# Patient Record
Sex: FEMALE | Race: WHITE | Employment: UNEMPLOYED | URBAN - METROPOLITAN AREA
[De-identification: names, ages, dates, MRNs, and addresses within clinical notes are randomized per-mention and may not be internally consistent; named-entity substitution may affect disease eponyms.]

---

## 2017-07-20 PROBLEM — R42 DIZZINESS: Status: ACTIVE | Noted: 2017-07-20

## 2018-02-01 PROBLEM — R53.1 WEAKNESS: Status: ACTIVE | Noted: 2018-02-01

## 2018-11-05 ENCOUNTER — APPOINTMENT (OUTPATIENT)
Dept: GENERAL RADIOLOGY | Age: 83
End: 2018-11-05
Payer: MEDICARE

## 2018-11-05 ENCOUNTER — APPOINTMENT (OUTPATIENT)
Dept: CT IMAGING | Age: 83
End: 2018-11-05
Payer: MEDICARE

## 2018-11-05 ENCOUNTER — HOSPITAL ENCOUNTER (EMERGENCY)
Age: 83
Discharge: HOME | End: 2018-11-06
Attending: EMERGENCY MEDICINE
Payer: MEDICARE

## 2018-11-05 DIAGNOSIS — R42 DIZZINESS: Primary | ICD-10-CM

## 2018-11-05 DIAGNOSIS — R73.9 HYPERGLYCEMIA: ICD-10-CM

## 2018-11-05 LAB
ALBUMIN SERPL-MCNC: 4.1 G/DL (ref 3.5–5.2)
ALP BLD-CCNC: 86 U/L (ref 35–104)
ALT SERPL-CCNC: 26 U/L (ref 0–32)
ANION GAP SERPL CALCULATED.3IONS-SCNC: 15 MMOL/L (ref 7–16)
AST SERPL-CCNC: 19 U/L (ref 0–31)
BASOPHILS ABSOLUTE: 0.02 E9/L (ref 0–0.2)
BASOPHILS RELATIVE PERCENT: 0.4 % (ref 0–2)
BILIRUB SERPL-MCNC: 0.3 MG/DL (ref 0–1.2)
BILIRUBIN URINE: NEGATIVE
BLOOD, URINE: NEGATIVE
BUN BLDV-MCNC: 16 MG/DL (ref 8–23)
CALCIUM SERPL-MCNC: 9.7 MG/DL (ref 8.6–10.2)
CHLORIDE BLD-SCNC: 95 MMOL/L (ref 98–107)
CLARITY: CLEAR
CO2: 27 MMOL/L (ref 22–29)
COLOR: YELLOW
CREAT SERPL-MCNC: 0.6 MG/DL (ref 0.5–1)
EOSINOPHILS ABSOLUTE: 0.07 E9/L (ref 0.05–0.5)
EOSINOPHILS RELATIVE PERCENT: 1.5 % (ref 0–6)
GFR AFRICAN AMERICAN: >60
GFR NON-AFRICAN AMERICAN: >60 ML/MIN/1.73
GLUCOSE BLD-MCNC: 553 MG/DL (ref 74–99)
GLUCOSE URINE: >=1000 MG/DL
HCT VFR BLD CALC: 42.8 % (ref 34–48)
HEMOGLOBIN: 14.2 G/DL (ref 11.5–15.5)
IMMATURE GRANULOCYTES #: 0.01 E9/L
IMMATURE GRANULOCYTES %: 0.2 % (ref 0–5)
KETONES, URINE: NEGATIVE MG/DL
LEUKOCYTE ESTERASE, URINE: NEGATIVE
LYMPHOCYTES ABSOLUTE: 1.61 E9/L (ref 1.5–4)
LYMPHOCYTES RELATIVE PERCENT: 34.5 % (ref 20–42)
MCH RBC QN AUTO: 28.2 PG (ref 26–35)
MCHC RBC AUTO-ENTMCNC: 33.2 % (ref 32–34.5)
MCV RBC AUTO: 85.1 FL (ref 80–99.9)
METER GLUCOSE: >500 MG/DL (ref 74–99)
MONOCYTES ABSOLUTE: 0.34 E9/L (ref 0.1–0.95)
MONOCYTES RELATIVE PERCENT: 7.3 % (ref 2–12)
NEUTROPHILS ABSOLUTE: 2.61 E9/L (ref 1.8–7.3)
NEUTROPHILS RELATIVE PERCENT: 56.1 % (ref 43–80)
NITRITE, URINE: NEGATIVE
PDW BLD-RTO: 12.6 FL (ref 11.5–15)
PH UA: 7 (ref 5–9)
PLATELET # BLD: 146 E9/L (ref 130–450)
PMV BLD AUTO: 11.6 FL (ref 7–12)
POTASSIUM REFLEX MAGNESIUM: 4.2 MMOL/L (ref 3.5–5)
PROTEIN UA: NEGATIVE MG/DL
RBC # BLD: 5.03 E12/L (ref 3.5–5.5)
SODIUM BLD-SCNC: 137 MMOL/L (ref 132–146)
SPECIFIC GRAVITY UA: 1.01 (ref 1–1.03)
TOTAL PROTEIN: 6.8 G/DL (ref 6.4–8.3)
TROPONIN: <0.01 NG/ML (ref 0–0.03)
UROBILINOGEN, URINE: 0.2 E.U./DL
WBC # BLD: 4.7 E9/L (ref 4.5–11.5)

## 2018-11-05 PROCEDURE — 70450 CT HEAD/BRAIN W/O DYE: CPT

## 2018-11-05 PROCEDURE — 99285 EMERGENCY DEPT VISIT HI MDM: CPT

## 2018-11-05 PROCEDURE — 96375 TX/PRO/DX INJ NEW DRUG ADDON: CPT

## 2018-11-05 PROCEDURE — 6370000000 HC RX 637 (ALT 250 FOR IP): Performed by: STUDENT IN AN ORGANIZED HEALTH CARE EDUCATION/TRAINING PROGRAM

## 2018-11-05 PROCEDURE — 81003 URINALYSIS AUTO W/O SCOPE: CPT

## 2018-11-05 PROCEDURE — 2580000003 HC RX 258: Performed by: STUDENT IN AN ORGANIZED HEALTH CARE EDUCATION/TRAINING PROGRAM

## 2018-11-05 PROCEDURE — 36415 COLL VENOUS BLD VENIPUNCTURE: CPT

## 2018-11-05 PROCEDURE — 6360000002 HC RX W HCPCS: Performed by: STUDENT IN AN ORGANIZED HEALTH CARE EDUCATION/TRAINING PROGRAM

## 2018-11-05 PROCEDURE — 73562 X-RAY EXAM OF KNEE 3: CPT

## 2018-11-05 PROCEDURE — 93005 ELECTROCARDIOGRAM TRACING: CPT | Performed by: STUDENT IN AN ORGANIZED HEALTH CARE EDUCATION/TRAINING PROGRAM

## 2018-11-05 PROCEDURE — 80053 COMPREHEN METABOLIC PANEL: CPT

## 2018-11-05 PROCEDURE — 71045 X-RAY EXAM CHEST 1 VIEW: CPT

## 2018-11-05 PROCEDURE — 84484 ASSAY OF TROPONIN QUANT: CPT

## 2018-11-05 PROCEDURE — 85025 COMPLETE CBC W/AUTO DIFF WBC: CPT

## 2018-11-05 PROCEDURE — 96374 THER/PROPH/DIAG INJ IV PUSH: CPT

## 2018-11-05 PROCEDURE — 82962 GLUCOSE BLOOD TEST: CPT

## 2018-11-05 RX ORDER — SODIUM CHLORIDE 9 MG/ML
500 INJECTION, SOLUTION INTRAVENOUS CONTINUOUS
Status: DISCONTINUED | OUTPATIENT
Start: 2018-11-05 | End: 2018-11-06 | Stop reason: HOSPADM

## 2018-11-05 RX ORDER — MECLIZINE HCL 12.5 MG/1
25 TABLET ORAL ONCE
Status: COMPLETED | OUTPATIENT
Start: 2018-11-05 | End: 2018-11-05

## 2018-11-05 RX ORDER — ONDANSETRON 2 MG/ML
4 INJECTION INTRAMUSCULAR; INTRAVENOUS ONCE
Status: COMPLETED | OUTPATIENT
Start: 2018-11-05 | End: 2018-11-05

## 2018-11-05 RX ORDER — 0.9 % SODIUM CHLORIDE 0.9 %
1000 INTRAVENOUS SOLUTION INTRAVENOUS ONCE
Status: COMPLETED | OUTPATIENT
Start: 2018-11-05 | End: 2018-11-06

## 2018-11-05 RX ADMIN — SODIUM CHLORIDE 500 ML: 9 INJECTION, SOLUTION INTRAVENOUS at 21:10

## 2018-11-05 RX ADMIN — SODIUM CHLORIDE 1000 ML: 9 INJECTION, SOLUTION INTRAVENOUS at 23:00

## 2018-11-05 RX ADMIN — MECLIZINE 25 MG: 12.5 TABLET ORAL at 23:02

## 2018-11-05 RX ADMIN — ONDANSETRON 4 MG: 2 INJECTION INTRAMUSCULAR; INTRAVENOUS at 21:12

## 2018-11-05 RX ADMIN — INSULIN HUMAN 6 UNITS: 100 INJECTION, SOLUTION PARENTERAL at 23:03

## 2018-11-05 ASSESSMENT — ENCOUNTER SYMPTOMS
RHINORRHEA: 0
SHORTNESS OF BREATH: 0
NAUSEA: 1
COUGH: 0
DIARRHEA: 0
VOMITING: 0
ABDOMINAL PAIN: 0
BACK PAIN: 0
SORE THROAT: 0

## 2018-11-06 VITALS
OXYGEN SATURATION: 97 % | BODY MASS INDEX: 27.73 KG/M2 | HEART RATE: 79 BPM | RESPIRATION RATE: 21 BRPM | DIASTOLIC BLOOD PRESSURE: 80 MMHG | SYSTOLIC BLOOD PRESSURE: 161 MMHG | WEIGHT: 142 LBS | TEMPERATURE: 98 F

## 2018-11-06 LAB
CHP ED QC CHECK: NORMAL
GLUCOSE BLD-MCNC: 373 MG/DL
METER GLUCOSE: 348 MG/DL (ref 74–99)
METER GLUCOSE: 373 MG/DL (ref 74–99)

## 2018-11-06 PROCEDURE — 96376 TX/PRO/DX INJ SAME DRUG ADON: CPT

## 2018-11-06 PROCEDURE — 6370000000 HC RX 637 (ALT 250 FOR IP): Performed by: STUDENT IN AN ORGANIZED HEALTH CARE EDUCATION/TRAINING PROGRAM

## 2018-11-06 PROCEDURE — 2580000003 HC RX 258: Performed by: STUDENT IN AN ORGANIZED HEALTH CARE EDUCATION/TRAINING PROGRAM

## 2018-11-06 PROCEDURE — 82962 GLUCOSE BLOOD TEST: CPT

## 2018-11-06 RX ORDER — 0.9 % SODIUM CHLORIDE 0.9 %
500 INTRAVENOUS SOLUTION INTRAVENOUS ONCE
Status: COMPLETED | OUTPATIENT
Start: 2018-11-06 | End: 2018-11-06

## 2018-11-06 RX ADMIN — INSULIN HUMAN 4 UNITS: 100 INJECTION, SOLUTION PARENTERAL at 00:47

## 2018-11-06 RX ADMIN — SODIUM CHLORIDE 500 ML: 9 INJECTION, SOLUTION INTRAVENOUS at 00:50

## 2018-11-06 NOTE — ED PROVIDER NOTES
Patient is a 80year old female who is presenting with complaint of dizziness. She states that an hour and a half ago she got up to go to the bathroom when she started to notice everything spinning. She started to fall and caught herself on the door handle. She then called ems and presented. She states this has happened to her in the past when her sugar went up. Per EMS she is an insulin dependent diabetic and is non-compliant. She is not able to give herself shots therefore does not take them. Her son comes by from time to time to give her shots, but is unable to do this for her everyday. She does have history of a stroke in the past which has left her left LE weak with decreased sensation. The history is provided by the patient. Dizziness   Quality:  Head spinning and room spinning  Severity:  Moderate  Onset quality:  Sudden  Duration:  4 hours  Timing:  Constant  Chronicity:  New  Context: eye movement and head movement    Relieved by:  None tried  Associated symptoms: nausea    Associated symptoms: no chest pain, no diarrhea, no headaches, no shortness of breath and no vomiting        Review of Systems   Constitutional: Negative for chills, diaphoresis and fever. HENT: Negative for congestion, rhinorrhea and sore throat. Eyes: Negative for visual disturbance. Respiratory: Negative for cough and shortness of breath. Cardiovascular: Negative for chest pain. Gastrointestinal: Positive for nausea. Negative for abdominal pain, diarrhea and vomiting. Genitourinary: Negative for dysuria and urgency. Musculoskeletal: Negative for back pain. Skin: Negative for rash. Neurological: Positive for dizziness. Negative for light-headedness, numbness and headaches. Physical Exam   Constitutional: She is oriented to person, place, and time. She appears well-developed and well-nourished. No distress. HENT:   Head: Normocephalic and atraumatic.    Eyes: Pupils are equal, round, and reactive to

## 2018-11-06 NOTE — ED NOTES
Patient taken off bedpan, extra linens removed, vitals taken, Dr. Darcel Kehr updated      Curtiss Closs, HERBIE  11/05/18 2945

## 2018-11-08 LAB
EKG ATRIAL RATE: 81 BPM
EKG P AXIS: 57 DEGREES
EKG P-R INTERVAL: 176 MS
EKG Q-T INTERVAL: 414 MS
EKG QRS DURATION: 80 MS
EKG QTC CALCULATION (BAZETT): 480 MS
EKG R AXIS: 10 DEGREES
EKG T AXIS: 82 DEGREES
EKG VENTRICULAR RATE: 81 BPM

## 2019-04-15 ENCOUNTER — APPOINTMENT (OUTPATIENT)
Dept: GENERAL RADIOLOGY | Age: 84
End: 2019-04-15
Payer: MEDICARE

## 2019-04-15 ENCOUNTER — HOSPITAL ENCOUNTER (EMERGENCY)
Age: 84
Discharge: HOME | End: 2019-04-15
Attending: EMERGENCY MEDICINE
Payer: MEDICARE

## 2019-04-15 VITALS
HEIGHT: 59 IN | SYSTOLIC BLOOD PRESSURE: 154 MMHG | DIASTOLIC BLOOD PRESSURE: 78 MMHG | OXYGEN SATURATION: 99 % | WEIGHT: 132 LBS | BODY MASS INDEX: 26.61 KG/M2 | HEART RATE: 74 BPM | RESPIRATION RATE: 17 BRPM | TEMPERATURE: 97.4 F

## 2019-04-15 DIAGNOSIS — M79.604 PAIN IN BOTH LOWER EXTREMITIES: Primary | ICD-10-CM

## 2019-04-15 DIAGNOSIS — G62.9 PERIPHERAL POLYNEUROPATHY: ICD-10-CM

## 2019-04-15 DIAGNOSIS — M79.605 PAIN IN BOTH LOWER EXTREMITIES: Primary | ICD-10-CM

## 2019-04-15 LAB
ALBUMIN SERPL-MCNC: 3.6 G/DL (ref 3.5–5.2)
ALP BLD-CCNC: 115 U/L (ref 35–104)
ALT SERPL-CCNC: 51 U/L (ref 0–32)
ANION GAP SERPL CALCULATED.3IONS-SCNC: 6 MMOL/L (ref 7–16)
AST SERPL-CCNC: 40 U/L (ref 0–31)
BASOPHILS ABSOLUTE: 0.03 E9/L (ref 0–0.2)
BASOPHILS RELATIVE PERCENT: 0.5 % (ref 0–2)
BETA-HYDROXYBUTYRATE: 0.5 MMOL/L (ref 0.02–0.27)
BILIRUB SERPL-MCNC: 0.4 MG/DL (ref 0–1.2)
BUN BLDV-MCNC: 17 MG/DL (ref 8–23)
CALCIUM SERPL-MCNC: 8.8 MG/DL (ref 8.6–10.2)
CHLORIDE BLD-SCNC: 98 MMOL/L (ref 98–107)
CO2: 27 MMOL/L (ref 22–29)
CREAT SERPL-MCNC: 0.6 MG/DL (ref 0.5–1)
EOSINOPHILS ABSOLUTE: 0.1 E9/L (ref 0.05–0.5)
EOSINOPHILS RELATIVE PERCENT: 1.5 % (ref 0–6)
GFR AFRICAN AMERICAN: >60
GFR AFRICAN AMERICAN: >60
GFR NON-AFRICAN AMERICAN: >60 ML/MIN/1.73
GFR NON-AFRICAN AMERICAN: >60 ML/MIN/1.73
GLUCOSE BLD-MCNC: 283 MG/DL (ref 74–99)
GLUCOSE BLD-MCNC: 293 MG/DL (ref 74–99)
HCT VFR BLD CALC: 40.6 % (ref 34–48)
HEMOGLOBIN: 13.7 G/DL (ref 11.5–15.5)
IMMATURE GRANULOCYTES #: 0.01 E9/L
IMMATURE GRANULOCYTES %: 0.2 % (ref 0–5)
LACTIC ACID: 1.2 MMOL/L (ref 0.5–2.2)
LYMPHOCYTES ABSOLUTE: 1.24 E9/L (ref 1.5–4)
LYMPHOCYTES RELATIVE PERCENT: 19 % (ref 20–42)
MCH RBC QN AUTO: 29.2 PG (ref 26–35)
MCHC RBC AUTO-ENTMCNC: 33.7 % (ref 32–34.5)
MCV RBC AUTO: 86.6 FL (ref 80–99.9)
METER GLUCOSE: 219 MG/DL (ref 74–99)
MONOCYTES ABSOLUTE: 0.58 E9/L (ref 0.1–0.95)
MONOCYTES RELATIVE PERCENT: 8.9 % (ref 2–12)
NEUTROPHILS ABSOLUTE: 4.55 E9/L (ref 1.8–7.3)
NEUTROPHILS RELATIVE PERCENT: 69.9 % (ref 43–80)
PDW BLD-RTO: 12.9 FL (ref 11.5–15)
PERFORMED ON: ABNORMAL
PH VENOUS: 7.34 (ref 7.3–7.42)
PLATELET # BLD: 178 E9/L (ref 130–450)
PMV BLD AUTO: 11 FL (ref 7–12)
POC CHLORIDE: 104 MMOL/L (ref 100–108)
POC CREATININE: 0.7 MG/DL (ref 0.5–1)
POC POTASSIUM: 6.4 MMOL/L (ref 3.5–5)
POC SODIUM: 134 MMOL/L (ref 132–146)
POTASSIUM REFLEX MAGNESIUM: 4.2 MMOL/L (ref 3.5–5)
POTASSIUM SERPL-SCNC: 4.8 MMOL/L (ref 3.5–5)
RBC # BLD: 4.69 E12/L (ref 3.5–5.5)
SODIUM BLD-SCNC: 131 MMOL/L (ref 132–146)
TOTAL CK: 40 U/L (ref 20–180)
TOTAL PROTEIN: 6.5 G/DL (ref 6.4–8.3)
WBC # BLD: 6.5 E9/L (ref 4.5–11.5)

## 2019-04-15 PROCEDURE — 80053 COMPREHEN METABOLIC PANEL: CPT

## 2019-04-15 PROCEDURE — 84295 ASSAY OF SERUM SODIUM: CPT

## 2019-04-15 PROCEDURE — 36415 COLL VENOUS BLD VENIPUNCTURE: CPT

## 2019-04-15 PROCEDURE — 73590 X-RAY EXAM OF LOWER LEG: CPT

## 2019-04-15 PROCEDURE — 82565 ASSAY OF CREATININE: CPT

## 2019-04-15 PROCEDURE — 85025 COMPLETE CBC W/AUTO DIFF WBC: CPT

## 2019-04-15 PROCEDURE — 84132 ASSAY OF SERUM POTASSIUM: CPT

## 2019-04-15 PROCEDURE — 82435 ASSAY OF BLOOD CHLORIDE: CPT

## 2019-04-15 PROCEDURE — 6360000002 HC RX W HCPCS: Performed by: EMERGENCY MEDICINE

## 2019-04-15 PROCEDURE — 82550 ASSAY OF CK (CPK): CPT

## 2019-04-15 PROCEDURE — 82947 ASSAY GLUCOSE BLOOD QUANT: CPT

## 2019-04-15 PROCEDURE — 82962 GLUCOSE BLOOD TEST: CPT

## 2019-04-15 PROCEDURE — 99284 EMERGENCY DEPT VISIT MOD MDM: CPT

## 2019-04-15 PROCEDURE — 82010 KETONE BODYS QUAN: CPT

## 2019-04-15 PROCEDURE — 82800 BLOOD PH: CPT

## 2019-04-15 PROCEDURE — 96375 TX/PRO/DX INJ NEW DRUG ADDON: CPT

## 2019-04-15 PROCEDURE — 2580000003 HC RX 258: Performed by: EMERGENCY MEDICINE

## 2019-04-15 PROCEDURE — 96374 THER/PROPH/DIAG INJ IV PUSH: CPT

## 2019-04-15 PROCEDURE — 83605 ASSAY OF LACTIC ACID: CPT

## 2019-04-15 PROCEDURE — 97165 OT EVAL LOW COMPLEX 30 MIN: CPT

## 2019-04-15 PROCEDURE — 97161 PT EVAL LOW COMPLEX 20 MIN: CPT

## 2019-04-15 RX ORDER — KETOROLAC TROMETHAMINE 30 MG/ML
15 INJECTION, SOLUTION INTRAMUSCULAR; INTRAVENOUS ONCE
Status: COMPLETED | OUTPATIENT
Start: 2019-04-15 | End: 2019-04-15

## 2019-04-15 RX ORDER — 0.9 % SODIUM CHLORIDE 0.9 %
1000 INTRAVENOUS SOLUTION INTRAVENOUS ONCE
Status: COMPLETED | OUTPATIENT
Start: 2019-04-15 | End: 2019-04-15

## 2019-04-15 RX ORDER — FENTANYL CITRATE 50 UG/ML
25 INJECTION, SOLUTION INTRAMUSCULAR; INTRAVENOUS ONCE
Status: COMPLETED | OUTPATIENT
Start: 2019-04-15 | End: 2019-04-15

## 2019-04-15 RX ADMIN — FENTANYL CITRATE 25 MCG: 50 INJECTION, SOLUTION INTRAMUSCULAR; INTRAVENOUS at 10:16

## 2019-04-15 RX ADMIN — SODIUM CHLORIDE 1000 ML: 9 INJECTION, SOLUTION INTRAVENOUS at 07:58

## 2019-04-15 RX ADMIN — KETOROLAC TROMETHAMINE 15 MG: 30 INJECTION, SOLUTION INTRAMUSCULAR at 12:02

## 2019-04-15 ASSESSMENT — PAIN SCALES - GENERAL
PAINLEVEL_OUTOF10: 10
PAINLEVEL_OUTOF10: 10
PAINLEVEL_OUTOF10: 9

## 2019-04-15 ASSESSMENT — PAIN DESCRIPTION - ORIENTATION: ORIENTATION: LEFT

## 2019-04-15 ASSESSMENT — PAIN DESCRIPTION - PAIN TYPE: TYPE: ACUTE PAIN

## 2019-04-15 NOTE — PROGRESS NOTES
Occupational Therapy  OCCUPATIONAL THERAPY INITIAL EVALUATION      Date:4/15/2019  Patient Name: Liyah Fenton  MRN: [id number]  : 1926  Room: [address] [place] [address]    Evaluating OT: Srinivas Callahan OTR/L #[id number]     AM-PAC Daily Activity Raw Score:     Recommended Adaptive Equipment:  TBD     Diagnosis: none   Patient presented to ED with B LE wounds and difficulty ambulating      Pertinent Medical History:    Past Medical History:   Diagnosis Date    Diabetes mellitus ([facility] [address]     Hypertension     Unspecified cerebral artery occlusion with cerebral infarction     affected right side      Precautions:  Falls     Home Living: Pt lives alone in a 1 story apartment with 3 JUAN PABLO and 1 hand rail   Bathroom setup: walk-in shower with seat   Equipment owned: w.w    Prior Level of Function: mod I with ADLs (assist with showers) , mod I with IADLs; ambulated with w/w  Driving: no  Occupation: na    Pain Level: Pt reports 9-10/10 L LE pain this sesison; Pt reports receiving pain medication prior to session    Cognition: A&O: 2/4 (self and place);  Follows 1 step directions   Memory:  Fair-   Sequencing:  fair   Problem solving:  Fair-   Judgement/safety:  Fair-     Functional Assessment:   Initial Eval Status  Date: 4/15/19 Treatment Status  Date: Short Term Goals  Treatment frequency: PRN   Feeding Independent       Grooming Stand by Assist   (seated)  Modified [place]    UB Dressing Minimal Assist   Modified [place]    LB Dressing Dependent   Moderate Assist    Bathing Maximal Assist  Moderate Assist    Toileting Dependent   Incontinent of urine  Moderate Assist    Bed Mobility  Supine to sit: Minimal Assist   Sit to supine: Maximal Assist   Supine to sit: Stand by Assist   Sit to supine: Stand by Assist    Functional Transfers Moderate Assist   Minimal Assist    Functional Mobility Moderate Assist   Several side steps to [facility] with w/w (assist with weightshfiting, w/w management, posture and sequencing)  Minimal Assist Balance Sitting:     Static:  SBA    Dynamic: SBA  Standing: min A (static); mod A dynamic      Activity Tolerance P+  F   Visual/  Perceptual Glasses: yes  wfl                  Hand dominance: right     Strength ROM Additional Info:    RUE   4-/5 wfl good  and wfl FMC/dexterity noted during ADL tasks     LUE 4-/5 wfl good  and wfl FMC/dexterity noted during ADL tasks     Hearing: wfl  Sensation:wfl  Tone: wfl  Edema:none noted                             Comments/Treatment: Upon arrival, patient supine in bed and agreeable to OT session with PT collaboration - son present. Therapist facilitated bed mobility, unsupported sitting balance, functional sit to stand transfers, side steps to [facility] and self-care tasks (LB dressing/toileting) - cuing on sequencing, body mechanics and safety. Pt demonstrating fair- understanding of education/techniques, requiring additional training / education. At end of session, patient supine in bed with call light and phone within reach, all lines intact. Pt would benefit from continued skilled OT to increase functional independence and quality of life.     Eval Complexity: Low    (Evaluation time includes thorough review of current medical information, gathering information on past medical history/social history and prior level of function, completion of standardized testing/informal observation of tasks, assessment of data, and development of POC/Goals.)      Assessment of current deficits   Functional mobility [x]  ADLs [x] Strength [x]  Cognition [x]  Functional transfers  [x] IADLs [x] Safety Awareness [x]  Endurance [x]  Fine Motor Coordination [] Balance [x] Vision/perception [] Sensation []   Gross Motor Coordination [] ROM [] Delirium []                  Motor Control []    Plan of Care:   ADL retraining [x]   Equipment needs [x]   Neuromuscular re-education [x] Energy Conservation Techniques [x]  Functional Transfer training [x] Patient and/or Family Education

## 2019-04-15 NOTE — ED PROVIDER NOTES
Department of Emergency Medicine   ED  Provider Note  Admit Date/RoomTime: 4/15/2019  7:34 AM  [facility]          History of Present Illness:  4/15/19, Time: 7:42 AM         Meng Carlisle is a 80 y.o. female presenting to the ED for elevated blood sugar, beginning quite some time ago. The complaint has been persistent, moderate in severity, and worsened by nothing. Hx of type 2 DM, reports she \"takes pills for her diabetes\" because \"I refuse to take insulin\". Says she has this chronic lower extremity scab that has been on her left lower leg for a while now. She says she wants to be checked out today. She denies fever, chills, chest pain, sob, abdominal pain, back pain, leg swelling or trauma. She is able to lift both legs and arms, no focal deficits. Review of Systems:   Pertinent positives and negatives are stated within HPI, all other systems reviewed and are negative.        --------------------------------------------- PAST HISTORY ---------------------------------------------  Past Medical History:  has a past medical history of Diabetes mellitus ([facility] [address] Hypertension, and Unspecified cerebral artery occlusion with cerebral infarction. Past Surgical History:  has a past surgical history that includes Carotid endarterectomy (Left); eye surgery (Bilateral); and Cholecystectomy (7/30/15). Social History:  reports that she has quit smoking. She has never used smokeless tobacco. She reports that she does not drink alcohol or use drugs. Family History: family history is not on file. The patients home medications have been reviewed. Allergies: Patient has no known allergies.         ---------------------------------------------------PHYSICAL EXAM--------------------------------------    Constitutional/General: Alert and oriented x3  Head: Normocephalic and atraumatic  Eyes: PERRL, EOMI, conjunctiva normal, sclera non icteric  Mouth: Oropharynx clear, handling secretions, no trismus, 26.0 - 35.0 pg    MCHC 33.7 32.0 - 34.5 %    RDW 12.9 11.5 - 15.0 fL    Platelets 161 492 - 772 E9/L    MPV 11.0 7.0 - 12.0 fL    Neutrophils % 69.9 43.0 - 80.0 %    Immature Granulocytes % 0.2 0.0 - 5.0 %    Lymphocytes % 19.0 (L) 20.0 - 42.0 %    Monocytes % 8.9 2.0 - 12.0 %    Eosinophils % 1.5 0.0 - 6.0 %    Basophils % 0.5 0.0 - 2.0 %    Neutrophils # 4.55 1.80 - 7.30 E9/L    Immature Granulocytes # 0.01 E9/L    Lymphocytes # 1.24 (L) 1.50 - 4.00 E9/L    Monocytes # 0.58 0.10 - 0.95 E9/L    Eosinophils # 0.10 0.05 - 0.50 E9/L    Basophils # 0.03 0.00 - 0.20 E9/L   Comprehensive Metabolic Panel   Result Value Ref Range    Sodium 131 (L) 132 - 146 mmol/L    Potassium 4.8 3.5 - 5.0 mmol/L    Chloride 98 98 - 107 mmol/L    CO2 27 22 - 29 mmol/L    Anion Gap 6 (L) 7 - 16 mmol/L    Glucose 293 (H) 74 - 99 mg/dL    BUN 17 8 - 23 mg/dL    CREATININE 0.6 0.5 - 1.0 mg/dL    GFR Non-African American >60 >=60 mL/min/1.73    GFR African American >60     Calcium 8.8 8.6 - 10.2 mg/dL    Total Protein 6.5 6.4 - 8.3 g/dL    Alb 3.6 3.5 - 5.2 g/dL    Total Bilirubin 0.4 0.0 - 1.2 mg/dL    Alkaline Phosphatase 115 (H) 35 - 104 U/L    ALT 51 (H) 0 - 32 U/L    AST 40 (H) 0 - 31 U/L   pH, venous   Result Value Ref Range    pH, Isael 7.34 7.30 - 7.42   Beta-Hydroxybutyrate   Result Value Ref Range    Beta-Hydroxybutyrate 0.50 (H) 0.02 - 0.27 mmol/L   Lactic Acid, Plasma   Result Value Ref Range    Lactic Acid 1.2 0.5 - 2.2 mmol/L   CK   Result Value Ref Range    Total CK 40 20 - 180 U/L   Potassium w/ Reflex to Magnesium   Result Value Ref Range    Potassium reflex Magnesium 4.2 3.5 - 5.0 mmol/L   POCT Venous   Result Value Ref Range    POC Sodium 134 132 - 146 mmol/L    POC Potassium 6.4 (H) 3.5 - 5.0 mmol/L    POC Chloride 104 100 - 108 mmol/L    POC Glucose 283 (H) 74 - 99 mg/dl    POC Creatinine 0.7 0.5 - 1.0 mg/dL    GFR Non-African American >60 >=60 mL/min/1.73    GFR  >60     Performed on SEE BELOW    POCT Glucose   Result Value Ref Range    Meter Glucose 219 (H) 74 - 99 mg/dL       RADIOLOGY:  Interpreted by Radiologist unless otherwise specified  XR TIBIA FIBULA RIGHT (2 VIEWS)   Final Result   Osteoarthritis right knee. Bones demineralized. No evidence of   osteomyelitis. XR TIBIA FIBULA LEFT (2 VIEWS)   Final Result   1. No radiographic evidence of acute osseous abnormality or fracture. .   If there is persistent clinical pain or symptomatology, a return to   medical attention within 2-7 days and further imaging is recommended. .   2. Vascular calcifications.                    ------------------------- NURSING NOTES AND VITALS REVIEWED ---------------------------   The nursing notes within the ED encounter and vital signs as below have been reviewed by myself. BP (!) 168/79   Pulse 72   Temp 97.4 °F (36.3 °C) (Temporal)   Resp 21   Ht 4' 11\" (1.499 m)   Wt 132 lb (59.9 kg)   SpO2 99%   BMI 26.66 kg/m²   Oxygen Saturation Interpretation: Normal    The patients available past medical records and past encounters were reviewed. ------------------------------ ED COURSE/MEDICAL DECISION MAKING----------------------  Medications   0.9 % sodium chloride bolus (0 mLs Intravenous Stopped 4/15/19 2058)   fentaNYL (SUBLIMAZE) injection 25 mcg (25 mcg Intravenous Given 4/15/19 1016)   ketorolac (TORADOL) injection 15 mg (15 mg Intravenous Given 4/15/19 1202)              Medical Decision Making:    Testing reassuring. Normal neurovascular exam. Normal pulses. No swelling. No signs of DVT. No signs of compartment syndrome. Xray reassuring. No signs of infection. Unable to ambulate without pain/assistance. ?from peripheral neuropathy secondary to poorly controlled diabetes. Medical testing reassuring, no signs of vascular occlusion.  arranged [facility].       Re-Evaluations:                Improving  Pain improving      This patient's ED course included: a personal history and physicial examination, re-evaluation prior to disposition, multiple bedside re-evaluations, IV medications, cardiac monitoring, continuous pulse oximetry and complex medical decision making and emergency management    This patient has remained hemodynamically stable during their ED course. Consultations:      Counseling: The emergency provider has spoken with the patient and discussed todays results, in addition to providing specific details for the plan of care and counseling regarding the diagnosis and prognosis. Questions are answered at this time and they are agreeable with the plan.       --------------------------------- IMPRESSION AND DISPOSITION ---------------------------------    IMPRESSION  1. Pain in both lower extremities    2. Peripheral polyneuropathy        DISPOSITION  Disposition: to [facility]  Patient condition is stable        NOTE: This report was transcribed using voice recognition software.  Every effort was made to ensure accuracy; however, inadvertent computerized transcription errors may be present        Yany Lopez MD  04/15/19 [address] Aburto MD  04/16/19 2034

## 2019-04-15 NOTE — CARE COORDINATION
Social Work Discharge Planning -     Pt has been accepted to [facility] and a bed is available. Pt will transport to [facility] via facility van. SW faxed all discharge paperwork. Santiago STONER, called report to facility.      Amanda Abreu MSW Intern   Reviewed by, DELFINA EdgeW

## 2019-04-15 NOTE — ED NOTES
Bed: 17B-17  Expected date:   Expected time:   Means of arrival:   Comments:  ems     Severiano Oxford, RN  04/15/19 6830

## 2019-04-15 NOTE — CARE COORDINATION
Social Work [address] [place] [address] Planning:    Pt presents to the ED secondary to hyperglycemia, bilateral lower extremity leg wounds, and difficulty ambulating. Pt is from home. JUDY met with pt and her son Cristine Ch). Pt's son reports pt has had leg pain and has not been able to walk for the past 4 days. Pt lives in a one floor apartment across the granger from her son. Pt has a wheeled walker at home and pt's son usually assists pt as needed. Pt has had no recent falls. Pt has hx at [address] and a [facility] but did not stated name of [facility] provider. Pt's PCP is Dr Neymar Payne and she uses [facility] [place] to fill her prescriptions. JUDY discussed [facility] placement and options with pt and her son. Pt stated she would like to go to [facility] upon discharge. SW made referral to Gabriel Middleton with [facility]. Pt has orders for PT/OT catracho. JUDY to follow.

## 2019-04-15 NOTE — PROGRESS NOTES
[facility]  Physical Therapy Initial Evaluation  Name: Ca Lujan  : 1926  MRN: [id number]    Date of Service: 4/15/2019    Evaluating Therapist: Arnoldo Dougherty PT, DPT  [id number]    Room #: 17B/17B-17  DIAGNOSIS: None listed  PRECAUTIONS: Falls, urinary incontinence  PMH: CVA with R hemiparesis, DM, HTN      Social:  Pt lives alone in a first floor apartment. There are 3 entry steps and a HR. Pt amb with [employer].  Pt's son lives in apartment building across the street and reports that he has to assist with stairs. Initial Evaluation  Date: 4/15 Treatment  Short Term/ Long Term   Goals   Was pt agreeable to Eval/treatment? Yes     Does pt have pain? 10/10 LLE     Bed Mobility  Rolling: Min A  Supine to sit: Mod A  Sit to supine: Max A  Scooting: Mod A  Min A   Transfers Sit to stand: Mod A from elevated bed  Stand to sit: Mod A  Stand pivot: NT  Min A    Ambulation   1 feet F/B and 2-3 feet side stepping to [facility] using [employer] with Mod A  >25 feet using [employer] with Min A   Stair negotiation:  NT  3 steps using 1 rail with Min A   ROM RLE: WFL  LLE: limited by pain     Strength RLE: 4-/5  LLE: 3-/5     Balance   Sitting: Min A  Standing: Mod A using [employer]     AM-PAC Basic Mobility Inpatient Short Form Raw Score:  10/24       Patient is A&Ox2; self and place only. Sensation: impaired to BLE   Coordination: NT  Edema: None noted     ASSESSMENT  Pt displays functional ability as noted in the objective portion of this evaluation. Comments/Treatment:  Pt was cleared by RN prior to PT evaluation. Pt was received supine and was agreeable to PT evaluation. Pt required assist for trunk righting and to fully clear BLE with bed mobility. Posterior LOB that required assist to correct with BLE ROM/MMT. Brief donned prior to standing due to h/o incontinence. Pt very guarded of LLE due to pain with ROM/MMT and movement.   Pt transferred to standing and required VCs for hand placement on [employer]. Pt was leaning posterior and avoided WB on LLE. Assist for [employer] advancement, weight shifting and balance with amb attempt. Pt very limited by pain and fear in standing. Pt was returned to supine and left with call button within reach and all needs met. Son at bedside. Patient education  Pt educated on PT role, safety with mobility, transfer technique, gait training and postural reducation. Patient response to education:   Pt verbalized understanding Pt demonstrated skill Pt requires further education in this area   Yes Requires assist/VCs Yes     Rehab potential is Good for reaching above PT goals. Pts/ family goals   1. To go to rehab. Patient and or family understand(s) diagnosis, prognosis, and plan of care. PLAN  PT care will be provided in accordance with the objectives noted above. Whenever appropriate, clear delegation orders will be provided for nursing staff. Exercises and functional mobility practice will be used as well as appropriate assistive devices or modalities to obtain goals. Patient and family education will also be administered as needed. Frequency of treatments will be 2-5x/week x 7-10 days.     Time in: [address]  Time out: [address], DPT  License [id number]

## 2019-04-15 NOTE — ED NOTES
LENORA GREGG placed on patient to help with urine specimen sample.       Leonides Hammer RN  04/15/19 1362

## 2019-05-15 ENCOUNTER — HOSPITAL ENCOUNTER (OUTPATIENT)
Dept: WOUND CARE | Age: 84
Discharge: HOME | End: 2019-05-15
Payer: MEDICARE

## 2019-05-15 VITALS
HEART RATE: 80 BPM | BODY MASS INDEX: 26.61 KG/M2 | TEMPERATURE: 97.7 F | DIASTOLIC BLOOD PRESSURE: 64 MMHG | RESPIRATION RATE: 18 BRPM | SYSTOLIC BLOOD PRESSURE: 126 MMHG | HEIGHT: 59 IN | WEIGHT: 132 LBS

## 2019-05-15 DIAGNOSIS — L97.922 NON-PRESSURE CHRONIC ULCER OF LOWER LEG WITH FAT LAYER EXPOSED, LEFT (HCC): Chronic | ICD-10-CM

## 2019-05-15 PROCEDURE — 11042 DBRDMT SUBQ TIS 1ST 20SQCM/<: CPT

## 2019-05-15 PROCEDURE — 11042 DBRDMT SUBQ TIS 1ST 20SQCM/<: CPT | Performed by: SURGERY

## 2019-05-15 PROCEDURE — 99214 OFFICE O/P EST MOD 30 MIN: CPT

## 2019-05-15 PROCEDURE — 99204 OFFICE O/P NEW MOD 45 MIN: CPT | Performed by: SURGERY

## 2019-05-15 RX ORDER — HYDROCODONE BITARTRATE AND ACETAMINOPHEN 5; 325 MG/1; MG/1
1 TABLET ORAL EVERY 8 HOURS PRN
Status: ON HOLD | COMMUNITY
End: 2019-09-09 | Stop reason: HOSPADM

## 2019-05-15 RX ORDER — FUROSEMIDE 20 MG/1
10 TABLET ORAL DAILY
Status: ON HOLD | COMMUNITY
End: 2019-09-09 | Stop reason: HOSPADM

## 2019-05-15 RX ORDER — ACETAMINOPHEN 325 MG/1
650 TABLET ORAL EVERY 4 HOURS PRN
COMMUNITY

## 2019-05-15 RX ORDER — LIDOCAINE HYDROCHLORIDE 20 MG/ML
JELLY TOPICAL ONCE
Status: DISCONTINUED | OUTPATIENT
Start: 2019-05-15 | End: 2019-05-16 | Stop reason: HOSPADM

## 2019-05-15 RX ORDER — PIOGLITAZONEHYDROCHLORIDE 15 MG/1
15 TABLET ORAL DAILY
COMMUNITY
End: 2020-02-07 | Stop reason: ALTCHOICE

## 2019-05-15 NOTE — H&P
[facility] /Hyper[facility]ics   History and Physical/Consultation  Vascular    Referring Physician : MD Enid Robins 1827 RECORD NUMBER:  [id number]  AGE: 80 y.o. GENDER: female  : 1926  EPISODE DATE:  5/15/2019  Subjective:     Chief Complaint   Patient presents with    Wound Check     left leg         HISTORY of PRESENT ILLNESS HPI     Per Oakley is a 80 y.o. female who presents today for wound/ulcer evaluation. Patient is accompanied by her son. She is a nursing home resident who developed a wound on her left pretibial region. She has been treated with local wound care. She is referred for vascular evaluation. She denies any rest pain in her toes or foot ulcerations. Diabetic/Pressure/Non Pressure Ulcers only:  Ulcer: Non-Pressure ulcer, fat layer exposed    If patient has diabetic lower extremity wounds  Viz[place]o Classification of diabetic lower extremity wounds:    Grade Description   []  0 No open wound   []  1 Superficial ulcer involving the full skin thickness   []  2 Deep ulcer involves ligament, tendon, joint capsule, or fascia  No bone involvement or abscess presence   []  3 Deep Ulcer with abcess formation and/or osteomyelitis   []  4 Localized gangrene   []  5 Extensive gangrene of the foot     Wound: N/A        PAST MEDICAL HISTORY      Diagnosis Date    Diabetes mellitus ([address]     Hypertension     Unspecified cerebral artery occlusion with cerebral infarction     affected right side     Past Surgical History:   Procedure Laterality Date    APPENDECTOMY      CAROTID ENDARTERECTOMY Left     CHOLECYSTECTOMY  7/30/15    Laparoscopic    EYE SURGERY Bilateral     implanted lens      History reviewed. No pertinent family history.   Social History     Tobacco Use    Smoking status: Former Smoker    Smokeless tobacco: Never Used   Substance Use Topics    Alcohol use: No    Drug use: No     No Known Allergies  Current Outpatient Medications on File Prior to Encounter   Medication Sig Dispense Refill    metFORMIN (GLUCOPHAGE) 500 MG tablet Take 1,000 mg by mouth 2 times daily (with meals)      furosemide (LASIX) 20 MG tablet Take 10 mg by mouth daily      HYDROcodone-acetaminophen (NORCO) 5-325 MG per tablet Take 1 tablet by mouth every 8 hours as needed for Pain.  pioglitazone (ACTOS) 15 MG tablet Take 15 mg by mouth daily      acetaminophen (TYLENOL) 325 MG tablet Take 650 mg by mouth every 6 hours as needed for Pain      amLODIPine (NORVASC) 5 MG tablet Take 1 tablet by mouth daily 30 tablet 3    senna (SENOKOT) 8.6 MG tablet Take 1 tablet by mouth daily as needed for Constipation      ALPRAZolam (XANAX) 0.25 MG tablet Take 0.25 mg by mouth 2 times daily.  aspirin 81 MG tablet Take 81 mg by mouth daily      glimepiride (AMARYL) 4 MG tablet Take 4 mg by mouth every morning (before breakfast)       No current facility-administered medications on file prior to encounter.         REVIEW OF SYSTEMS   ROS : All others Negative if blank [], Positive if [x]  General Urinary   [] Fevers [] Hematuria   [] Chills [] Dysuria   [] Weight Loss Vascular   Skin [] Claudication   [] Tissue Loss [] Rest Pain   Eyes Neurologic   [] Wears Glasses/Contacts [] Stroke/TIA   [] Vision Changes [] Focal weakness   Respiratory [] Slurred Speech    [] Shortness of breath ENT   Cardiovascular [] Difficulty swallowing   [] Chest Pain Gastrointestinal   [] Shortness of breath with exertion [] Abdominal Pain    [] Melena       [] Hematochezia               Objective:    /64   Pulse 80   Temp 97.7 °F (36.5 °C) (Oral)   Resp 18   Ht 4' 11\" (1.499 m)   Wt 132 lb (59.9 kg)   BMI 26.66 kg/m²   Wt Readings from Last 3 Encounters:   05/15/19 132 lb (59.9 kg)   04/15/19 132 lb (59.9 kg)   11/05/18 142 lb (64.4 kg)       PHYSICAL EXAM  CONSTITUTIONAL:   Awake, alert, cooperative   PSYCHIATRIC :  Oriented to time, place and person      normal insight to disease Post-Procedure Depth (cm) 0.2 cm 5/15/2019  2:18 PM   Post-Procedure Surface Area (cm^2) 3.57 cm^2 5/15/2019  2:18 PM   Post-Procedure Volume (cm^3) 0.71 cm^3 5/15/2019  2:18 PM   Wound Assessment Pink;Yellow 5/15/2019  2:04 PM   Drainage Amount Small 5/15/2019  2:04 PM   Drainage Description Serosanguinous 5/15/2019  2:04 PM   Odor None 5/15/2019  2:04 PM   Karla-wound Assessment Red;Pink 5/15/2019  2:04 PM   Number of days:        Percent of Wound/Ulcer Debrided: 100%    Total Surface Area Debrided:  4 sq cm     Estimated Blood Loss:  None    Hemostasis Achieved:  not needed    Procedural Pain:  2  / 10     Post Procedural Pain:  0 / 10     Response to treatment:  Well tolerated by patient. A culture was not done. Plan:     I reviewed with the patient and her son that given her age, I would not recommend any vascular testing and certainly not vascular intervention. Her ulcer is not ischemic in origin. She does have vascular disease but her perfusion should be adequate to heal this wound. In my professional opinion and based off the information that is available at this time this patient has appropriate indication for HBO Therapy: No    Treatment Note please see attached Discharge Instructions    Written patient dismissal instructions given to patient and signed by patient or POA. Discharge Instructions       Visit Discharge/Physician Orders    Discharge condition: Stable    Assessment of pain at discharge:NONE    Anesthetic used: LIDOCAINE 2%    Discharge to: ECF    Left via:ambulance    Accompanied by: SON    ECF/HHA: [facility]    Dressing Orders:WOUND LOCATION LEFT LEG WOUND CARE PER DR EDWARDS    CLEANSE WOUND WITH NORMAL SALINE APPLY AQUACEL, COVER WITH A DRY DRESSING AND SECURE    Treatment Orders:FOLLOW A NUTRITIOUS DIET HIGH IN PROTEIN AND VITAMIN C TO PROMOTE WOUND HEALING. TAKE A MULTIVITAMIN DAILY.     DR Jacklyn Flanagan DID VASCULAR CONSULT / NO INTERVENTION AT THIS TIME    [facility] followup visit __CONSULT ONLY___________________________  (Please note your next appointment above and if you are unable to keep, kindly give a 24 hour notice. Thank you.)    Physician signature:__________________________      If you experience any of the following, please call the [facility] [address] during business hours:    * Increase in Pain  * Temperature over 101  * Increase in drainage from your wound  * Drainage with a foul odor  * Bleeding  * Increase in swelling  * Need for compression bandage changes due to slippage, breakthrough drainage. If you need medical attention outside of the business hours of the [facility] [address] please contact your PCP or go to the nearest emergency room.         Electronically signed by Otis Brice MD on 5/15/2019 at 2:24 PM

## 2019-09-06 ENCOUNTER — APPOINTMENT (OUTPATIENT)
Dept: ULTRASOUND IMAGING | Age: 84
DRG: 057 | End: 2019-09-06
Payer: MEDICARE

## 2019-09-06 ENCOUNTER — HOSPITAL ENCOUNTER (INPATIENT)
Age: 84
LOS: 3 days | Discharge: OTHER INSTITUTION | DRG: 057 | End: 2019-09-09
Attending: EMERGENCY MEDICINE | Admitting: FAMILY MEDICINE
Payer: MEDICARE

## 2019-09-06 ENCOUNTER — APPOINTMENT (OUTPATIENT)
Dept: CT IMAGING | Age: 84
DRG: 057 | End: 2019-09-06
Payer: MEDICARE

## 2019-09-06 DIAGNOSIS — G45.9 TIA (TRANSIENT ISCHEMIC ATTACK): Primary | ICD-10-CM

## 2019-09-06 PROBLEM — I63.9 ACUTE CEREBROVASCULAR ACCIDENT (CVA) (HCC): Status: ACTIVE | Noted: 2019-09-06

## 2019-09-06 LAB
ALBUMIN SERPL-MCNC: 3.8 G/DL (ref 3.5–5.2)
ALP BLD-CCNC: 76 U/L (ref 35–104)
ALT SERPL-CCNC: 15 U/L (ref 0–32)
ANION GAP SERPL CALCULATED.3IONS-SCNC: 10 MMOL/L (ref 7–16)
AST SERPL-CCNC: 17 U/L (ref 0–31)
BASOPHILS ABSOLUTE: 0.03 E9/L (ref 0–0.2)
BASOPHILS RELATIVE PERCENT: 0.6 % (ref 0–2)
BETA-HYDROXYBUTYRATE: 0.16 MMOL/L (ref 0.02–0.27)
BILIRUB SERPL-MCNC: 0.2 MG/DL (ref 0–1.2)
BUN BLDV-MCNC: 14 MG/DL (ref 8–23)
CALCIUM SERPL-MCNC: 9 MG/DL (ref 8.6–10.2)
CHLORIDE BLD-SCNC: 99 MMOL/L (ref 98–107)
CHP ED QC CHECK: YES
CO2: 27 MMOL/L (ref 22–29)
CREAT SERPL-MCNC: 0.7 MG/DL (ref 0.5–1)
EOSINOPHILS ABSOLUTE: 0.1 E9/L (ref 0.05–0.5)
EOSINOPHILS RELATIVE PERCENT: 1.9 % (ref 0–6)
GFR AFRICAN AMERICAN: >60
GFR NON-AFRICAN AMERICAN: >60 ML/MIN/1.73
GLUCOSE BLD-MCNC: 340 MG/DL
GLUCOSE BLD-MCNC: 340 MG/DL (ref 74–99)
HCT VFR BLD CALC: 41.4 % (ref 34–48)
HEMOGLOBIN: 14 G/DL (ref 11.5–15.5)
IMMATURE GRANULOCYTES #: 0.01 E9/L
IMMATURE GRANULOCYTES %: 0.2 % (ref 0–5)
LYMPHOCYTES ABSOLUTE: 2.22 E9/L (ref 1.5–4)
LYMPHOCYTES RELATIVE PERCENT: 43.3 % (ref 20–42)
MCH RBC QN AUTO: 29 PG (ref 26–35)
MCHC RBC AUTO-ENTMCNC: 33.8 % (ref 32–34.5)
MCV RBC AUTO: 85.7 FL (ref 80–99.9)
METER GLUCOSE: 193 MG/DL (ref 74–99)
METER GLUCOSE: 269 MG/DL (ref 74–99)
METER GLUCOSE: 270 MG/DL (ref 74–99)
MONOCYTES ABSOLUTE: 0.4 E9/L (ref 0.1–0.95)
MONOCYTES RELATIVE PERCENT: 7.8 % (ref 2–12)
NEUTROPHILS ABSOLUTE: 2.37 E9/L (ref 1.8–7.3)
NEUTROPHILS RELATIVE PERCENT: 46.2 % (ref 43–80)
PDW BLD-RTO: 12.7 FL (ref 11.5–15)
PLATELET # BLD: 138 E9/L (ref 130–450)
PMV BLD AUTO: 11 FL (ref 7–12)
POTASSIUM SERPL-SCNC: 4.4 MMOL/L (ref 3.5–5)
RBC # BLD: 4.83 E12/L (ref 3.5–5.5)
SODIUM BLD-SCNC: 136 MMOL/L (ref 132–146)
TOTAL PROTEIN: 6.4 G/DL (ref 6.4–8.3)
TROPONIN: <0.01 NG/ML (ref 0–0.03)
WBC # BLD: 5.1 E9/L (ref 4.5–11.5)

## 2019-09-06 PROCEDURE — 94760 N-INVAS EAR/PLS OXIMETRY 1: CPT

## 2019-09-06 PROCEDURE — 2580000003 HC RX 258: Performed by: FAMILY MEDICINE

## 2019-09-06 PROCEDURE — 82962 GLUCOSE BLOOD TEST: CPT

## 2019-09-06 PROCEDURE — 93005 ELECTROCARDIOGRAM TRACING: CPT | Performed by: EMERGENCY MEDICINE

## 2019-09-06 PROCEDURE — 85025 COMPLETE CBC W/AUTO DIFF WBC: CPT

## 2019-09-06 PROCEDURE — 6370000000 HC RX 637 (ALT 250 FOR IP): Performed by: FAMILY MEDICINE

## 2019-09-06 PROCEDURE — 6370000000 HC RX 637 (ALT 250 FOR IP): Performed by: EMERGENCY MEDICINE

## 2019-09-06 PROCEDURE — 36415 COLL VENOUS BLD VENIPUNCTURE: CPT

## 2019-09-06 PROCEDURE — G0378 HOSPITAL OBSERVATION PER HR: HCPCS

## 2019-09-06 PROCEDURE — 82010 KETONE BODYS QUAN: CPT

## 2019-09-06 PROCEDURE — 80053 COMPREHEN METABOLIC PANEL: CPT

## 2019-09-06 PROCEDURE — 96372 THER/PROPH/DIAG INJ SC/IM: CPT

## 2019-09-06 PROCEDURE — 93880 EXTRACRANIAL BILAT STUDY: CPT

## 2019-09-06 PROCEDURE — 6360000002 HC RX W HCPCS: Performed by: FAMILY MEDICINE

## 2019-09-06 PROCEDURE — 96374 THER/PROPH/DIAG INJ IV PUSH: CPT

## 2019-09-06 PROCEDURE — 99285 EMERGENCY DEPT VISIT HI MDM: CPT

## 2019-09-06 PROCEDURE — 70450 CT HEAD/BRAIN W/O DYE: CPT

## 2019-09-06 PROCEDURE — 2060000000 HC ICU INTERMEDIATE R&B

## 2019-09-06 PROCEDURE — 84484 ASSAY OF TROPONIN QUANT: CPT

## 2019-09-06 RX ORDER — NICOTINE POLACRILEX 4 MG
15 LOZENGE BUCCAL PRN
Status: DISCONTINUED | OUTPATIENT
Start: 2019-09-06 | End: 2019-09-09 | Stop reason: HOSPADM

## 2019-09-06 RX ORDER — AMLODIPINE BESYLATE 5 MG/1
5 TABLET ORAL DAILY
Status: DISCONTINUED | OUTPATIENT
Start: 2019-09-06 | End: 2019-09-09 | Stop reason: HOSPADM

## 2019-09-06 RX ORDER — DEXTROSE MONOHYDRATE 50 MG/ML
100 INJECTION, SOLUTION INTRAVENOUS PRN
Status: DISCONTINUED | OUTPATIENT
Start: 2019-09-06 | End: 2019-09-09 | Stop reason: HOSPADM

## 2019-09-06 RX ORDER — DEXTROSE MONOHYDRATE 25 G/50ML
12.5 INJECTION, SOLUTION INTRAVENOUS PRN
Status: DISCONTINUED | OUTPATIENT
Start: 2019-09-06 | End: 2019-09-09 | Stop reason: HOSPADM

## 2019-09-06 RX ORDER — SODIUM CHLORIDE 0.9 % (FLUSH) 0.9 %
10 SYRINGE (ML) INJECTION EVERY 12 HOURS SCHEDULED
Status: DISCONTINUED | OUTPATIENT
Start: 2019-09-06 | End: 2019-09-09 | Stop reason: HOSPADM

## 2019-09-06 RX ORDER — GLIMEPIRIDE 4 MG/1
4 TABLET ORAL
Status: DISCONTINUED | OUTPATIENT
Start: 2019-09-07 | End: 2019-09-09 | Stop reason: HOSPADM

## 2019-09-06 RX ORDER — ONDANSETRON 2 MG/ML
4 INJECTION INTRAMUSCULAR; INTRAVENOUS EVERY 6 HOURS PRN
Status: DISCONTINUED | OUTPATIENT
Start: 2019-09-06 | End: 2019-09-09 | Stop reason: HOSPADM

## 2019-09-06 RX ORDER — FUROSEMIDE 20 MG/1
10 TABLET ORAL DAILY
Status: DISCONTINUED | OUTPATIENT
Start: 2019-09-06 | End: 2019-09-09 | Stop reason: HOSPADM

## 2019-09-06 RX ORDER — SODIUM CHLORIDE 0.9 % (FLUSH) 0.9 %
10 SYRINGE (ML) INJECTION PRN
Status: DISCONTINUED | OUTPATIENT
Start: 2019-09-06 | End: 2019-09-09 | Stop reason: HOSPADM

## 2019-09-06 RX ORDER — ACETAMINOPHEN 325 MG/1
650 TABLET ORAL EVERY 6 HOURS PRN
Status: DISCONTINUED | OUTPATIENT
Start: 2019-09-06 | End: 2019-09-09 | Stop reason: HOSPADM

## 2019-09-06 RX ORDER — ASPIRIN 81 MG/1
81 TABLET, CHEWABLE ORAL DAILY
Status: DISCONTINUED | OUTPATIENT
Start: 2019-09-06 | End: 2019-09-07

## 2019-09-06 RX ORDER — ALPRAZOLAM 0.25 MG/1
0.25 TABLET ORAL 2 TIMES DAILY
Status: DISCONTINUED | OUTPATIENT
Start: 2019-09-06 | End: 2019-09-09 | Stop reason: HOSPADM

## 2019-09-06 RX ORDER — SENNA PLUS 8.6 MG/1
1 TABLET ORAL DAILY PRN
Status: DISCONTINUED | OUTPATIENT
Start: 2019-09-06 | End: 2019-09-09 | Stop reason: HOSPADM

## 2019-09-06 RX ADMIN — Medication 10 ML: at 20:34

## 2019-09-06 RX ADMIN — ALPRAZOLAM 0.25 MG: 0.25 TABLET ORAL at 20:35

## 2019-09-06 RX ADMIN — INSULIN LISPRO 3 UNITS: 100 INJECTION, SOLUTION INTRAVENOUS; SUBCUTANEOUS at 20:35

## 2019-09-06 RX ADMIN — ENOXAPARIN SODIUM 40 MG: 40 INJECTION SUBCUTANEOUS at 18:09

## 2019-09-06 RX ADMIN — INSULIN HUMAN 5 UNITS: 100 INJECTION, SOLUTION PARENTERAL at 15:00

## 2019-09-06 RX ADMIN — INSULIN LISPRO 2 UNITS: 100 INJECTION, SOLUTION INTRAVENOUS; SUBCUTANEOUS at 17:48

## 2019-09-06 NOTE — ED NOTES
Patient presents by ems from home for an episode of difficult speech and increased right sided weakness this am per son. No deficits at this time. Patients right side baseline is weaker than left due to hx of a CVA.       Мария Valentine RN  09/06/19 9509

## 2019-09-06 NOTE — DISCHARGE INSTR - COC
concentrate and follow conversation    IV Access:  - None    Nursing Mobility/ADLs:  Walking   Assisted  Transfer  Assisted  Bathing  Assisted  Dressing  Assisted  Toileting  Assisted  Feeding  Independent  Med Admin  Assisted  Med Delivery   whole and in applesauce     Wound Care Documentation and Therapy:  Wound Pretibial Left; Lower wound # 1 acquired 1/1/19  (Active)   Number of days:         Elimination:  Continence:   · Bowel: Yes  · Bladder: Yes  Urinary Catheter: None   Colostomy/Ileostomy/Ileal Conduit: No       Date of Last BM: 9/8/2019  No intake or output data in the 24 hours ending 09/06/19 1336  No intake/output data recorded. Safety Concerns:     Sundowners [place], History of Falls (last 30 days) and At Risk for Falls    Impairments/Disabilities:      Speech and Vision    Nutrition Therapy:  Current Nutrition Therapy:   - Oral Diet:  General    Routes of Feeding: Oral  Liquids: Thin Liquids  Daily Fluid Restriction: no  Last Modified Barium Swallow with Video (Video Swallowing Test): not done    Treatments at the Time of Hospital Discharge:   Respiratory Treatments:   Oxygen Therapy:  is not on home oxygen therapy.   Ventilator:    - No ventilator support    Rehab Therapies: Physical Therapy, Occupational Therapy and Speech/Language Therapy  Weight Bearing Status/Restrictions: No weight bearing restirctions  Other Medical Equipment (for information only, NOT a DME order):  walker  Other Treatments:     Patient's personal belongings (please select all that are sent with patient):  Glasses    RN SIGNATURE:  Electronically signed by Jasmina Bain RN on 9/9/19 at 9:58 AM    CASE MANAGEMENT/SOCIAL WORK SECTION    Inpatient Status Date: ***    Readmission Risk Assessment Score:  Readmission Risk              Risk of Unplanned Readmission:        0           Discharging to Facility/ Agency   · Name:   · Address:  · Phone:  · Fax:    Dialysis Facility (if applicable)   · Name:  · Address:  · Dialysis

## 2019-09-07 LAB
CHOLESTEROL, TOTAL: 275 MG/DL (ref 0–199)
EKG ATRIAL RATE: 72 BPM
EKG Q-T INTERVAL: 402 MS
EKG QRS DURATION: 78 MS
EKG QTC CALCULATION (BAZETT): 458 MS
EKG T AXIS: 83 DEGREES
EKG VENTRICULAR RATE: 78 BPM
HBA1C MFR BLD: 11.1 % (ref 4–5.6)
HCT VFR BLD CALC: 43.4 % (ref 34–48)
HDLC SERPL-MCNC: 65 MG/DL
HEMOGLOBIN: 14.3 G/DL (ref 11.5–15.5)
LDL CHOLESTEROL CALCULATED: 171 MG/DL (ref 0–99)
MCH RBC QN AUTO: 28.7 PG (ref 26–35)
MCHC RBC AUTO-ENTMCNC: 32.9 % (ref 32–34.5)
MCV RBC AUTO: 87.1 FL (ref 80–99.9)
METER GLUCOSE: 176 MG/DL (ref 74–99)
METER GLUCOSE: 184 MG/DL (ref 74–99)
METER GLUCOSE: 265 MG/DL (ref 74–99)
METER GLUCOSE: 316 MG/DL (ref 74–99)
METER GLUCOSE: 331 MG/DL (ref 74–99)
METER GLUCOSE: 421 MG/DL (ref 74–99)
PDW BLD-RTO: 12.8 FL (ref 11.5–15)
PLATELET # BLD: 136 E9/L (ref 130–450)
PMV BLD AUTO: 11.2 FL (ref 7–12)
RBC # BLD: 4.98 E12/L (ref 3.5–5.5)
TRIGL SERPL-MCNC: 193 MG/DL (ref 0–149)
VLDLC SERPL CALC-MCNC: 39 MG/DL
WBC # BLD: 6 E9/L (ref 4.5–11.5)

## 2019-09-07 PROCEDURE — 97165 OT EVAL LOW COMPLEX 30 MIN: CPT

## 2019-09-07 PROCEDURE — 2060000000 HC ICU INTERMEDIATE R&B

## 2019-09-07 PROCEDURE — 36415 COLL VENOUS BLD VENIPUNCTURE: CPT

## 2019-09-07 PROCEDURE — 97530 THERAPEUTIC ACTIVITIES: CPT | Performed by: PHYSICAL THERAPIST

## 2019-09-07 PROCEDURE — G0378 HOSPITAL OBSERVATION PER HR: HCPCS

## 2019-09-07 PROCEDURE — 80061 LIPID PANEL: CPT

## 2019-09-07 PROCEDURE — 92523 SPEECH SOUND LANG COMPREHEN: CPT

## 2019-09-07 PROCEDURE — 93010 ELECTROCARDIOGRAM REPORT: CPT | Performed by: INTERNAL MEDICINE

## 2019-09-07 PROCEDURE — 85027 COMPLETE CBC AUTOMATED: CPT

## 2019-09-07 PROCEDURE — 97161 PT EVAL LOW COMPLEX 20 MIN: CPT | Performed by: PHYSICAL THERAPIST

## 2019-09-07 PROCEDURE — 82962 GLUCOSE BLOOD TEST: CPT

## 2019-09-07 PROCEDURE — 6370000000 HC RX 637 (ALT 250 FOR IP): Performed by: FAMILY MEDICINE

## 2019-09-07 PROCEDURE — 2580000003 HC RX 258: Performed by: FAMILY MEDICINE

## 2019-09-07 PROCEDURE — 6360000002 HC RX W HCPCS: Performed by: FAMILY MEDICINE

## 2019-09-07 PROCEDURE — 96372 THER/PROPH/DIAG INJ SC/IM: CPT

## 2019-09-07 PROCEDURE — 83036 HEMOGLOBIN GLYCOSYLATED A1C: CPT

## 2019-09-07 PROCEDURE — 97535 SELF CARE MNGMENT TRAINING: CPT

## 2019-09-07 RX ORDER — ASPIRIN 81 MG/1
81 TABLET, CHEWABLE ORAL 2 TIMES DAILY
Status: DISCONTINUED | OUTPATIENT
Start: 2019-09-07 | End: 2019-09-09 | Stop reason: HOSPADM

## 2019-09-07 RX ADMIN — Medication 10 ML: at 20:05

## 2019-09-07 RX ADMIN — Medication 10 ML: at 09:06

## 2019-09-07 RX ADMIN — ALPRAZOLAM 0.25 MG: 0.25 TABLET ORAL at 09:07

## 2019-09-07 RX ADMIN — INSULIN LISPRO 8 UNITS: 100 INJECTION, SOLUTION INTRAVENOUS; SUBCUTANEOUS at 12:04

## 2019-09-07 RX ADMIN — INSULIN LISPRO 3 UNITS: 100 INJECTION, SOLUTION INTRAVENOUS; SUBCUTANEOUS at 20:07

## 2019-09-07 RX ADMIN — FUROSEMIDE 10 MG: 20 TABLET ORAL at 09:07

## 2019-09-07 RX ADMIN — METFORMIN HYDROCHLORIDE 1000 MG: 1000 TABLET ORAL at 16:56

## 2019-09-07 RX ADMIN — ALPRAZOLAM 0.25 MG: 0.25 TABLET ORAL at 20:05

## 2019-09-07 RX ADMIN — AMLODIPINE BESYLATE 5 MG: 5 TABLET ORAL at 09:07

## 2019-09-07 RX ADMIN — ASPIRIN 81 MG 81 MG: 81 TABLET ORAL at 20:05

## 2019-09-07 RX ADMIN — ENOXAPARIN SODIUM 40 MG: 40 INJECTION SUBCUTANEOUS at 09:07

## 2019-09-07 RX ADMIN — ASPIRIN 81 MG 81 MG: 81 TABLET ORAL at 09:07

## 2019-09-07 RX ADMIN — SENNOSIDES 8.6 MG: 8.6 TABLET, FILM COATED ORAL at 09:07

## 2019-09-07 RX ADMIN — METFORMIN HYDROCHLORIDE 1000 MG: 1000 TABLET ORAL at 09:07

## 2019-09-07 RX ADMIN — INSULIN LISPRO 2 UNITS: 100 INJECTION, SOLUTION INTRAVENOUS; SUBCUTANEOUS at 09:07

## 2019-09-07 RX ADMIN — INSULIN LISPRO 2 UNITS: 100 INJECTION, SOLUTION INTRAVENOUS; SUBCUTANEOUS at 16:59

## 2019-09-07 RX ADMIN — GLIMEPIRIDE 4 MG: 4 TABLET ORAL at 06:31

## 2019-09-07 ASSESSMENT — PAIN SCALES - GENERAL: PAINLEVEL_OUTOF10: 0

## 2019-09-07 NOTE — H&P
on the right side, not on the left. CHEST:  Clear to auscultation. HEART:  Regular with occasional ectopy. ABDOMEN:  Soft, nontender at this point. EXTREMITIES:  Reveal no cyanosis, clubbing or edema. She has diminished  peripheral pulses. NEUROLOGIC:  Speech sounds fine. Seems to have some weakness on the  right side compared to the left, upper and lower at this point. She  seems to be alert and oriented at this time however. IMPRESSION:  TIA versus remote history of left-sided CVA and left  carotid endarterectomy. She has a history of diabetes, which has never  been well controlled due to her constant eating of sweets and also  hyperlipidemia since she has refused statins in the past.    PLAN:  We will increase her aspirin to twice a day, PT, OT. She would  benefit from inpatient subacute rehab. Social service has also been  consulted. Discharge plan, hopefully, to subacute rehab once stable.         Jayesh Tavera MD    D: 09/07/2019 9:23:19       T: 09/07/2019 9:34:45     /[email or web address]  Job#: [id number]     Doc#: [id number]    CC:

## 2019-09-07 NOTE — PROGRESS NOTES
Call to Dr. Jovi Jefferson for patient's frequent episodes of diarrhea requesting imodium. Dr. Quincy Perea covering, waiting for orders.

## 2019-09-07 NOTE — PROGRESS NOTES
SPEECH/LANGUAGE PATHOLOGY  SPEECH/LANGUAGE/COGNITIVE EVALUATION      PATIENT NAME:  Helder Chinchilla      :  1926          TODAY'S DATE:  2019      ADMITTING DIAGNOSIS: TIA (transient ischemic attack) [G45.9]  Acute cerebrovascular accident ([address]A) ([facility] [address] [I63.9]  TIA (transient ischemic attack) [G45.9]  Acute cerebrovascular accident (CVA) ([facility] [address] [I63.9]    SPEECH PATHOLOGY DIAGNOSIS:    Mild cognitive deficit. Pt reported anomia during conversation inconsistently,   This was not noted during this evaluation    THERAPY RECOMMENDATIONS:   Speech Pathology intervention is recommended 3-5 times per week for LOS or when goals are met with emphasis on the following: To improve all areas of memory for functional activities  To monitor conversation for anomia               MOTOR SPEECH       Oral Peripheral Examination   Adequate lingual/labial strength     Parameters of Speech Production  Respiration:  Adequate for speech production  Articulation:  Within functional limits  Resonance:  Within functional limits  Quality:   Within functional limits  Pitch: Within functional limits  Intensity: Within functional limits  Fluency:  Intact  Prosody Intact    RECEPTIVE LANGUAGE    Comprehension of Yes/No Questions:    Within normal limits    Process  Simple Verbal Commands:   Within normal limits  Process Intermediate Verbal Commands:   Within normal limits  Process Complex Verbal Commands:     Perseveration    Comprehension of Conversation:      Within normal limits      EXPRESSIVE LANGUAGE     Serials: Functional    Imitation:  Words   Functional   Sentences Functional    Naming:  (Modality used:  Verbal)  Confrontation Naming  Functional  Functional Description  Functional  Response Naming: Functional    Conversation:      Conversation was within functional limits    COGNITION     Attention/Orientation  Attention: Sustained attention   Orientation:  Oriented to Person, Place, Date, Reason for hospitalization    Memory   Immediate Recall: Repeated 3/3    Delayed Recall:   Recalled 1/3     Long Term Recall:   Recalled Address, Birthdate, Age and Family    Organization/Problem Solving/Reasoning   Verbal Sequencing:   Functional        Verbal Problem solving:   Functional          CLINICAL OBSERVATIONS NOTED DURING THE EVALUATION  Latent responses                  Prognosis for improvements is good  This plan will be re-evaluated and revised in 1 week  if warranted. Patient stated goals: Agreed with above  Treatment goals discussed with Patient  The Patient understand the diagnosis, prognosis and plan of care. The admitting diagnosis and active problem list, as listed below have been reviewed prior to initiation of this evaluation.         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

## 2019-09-07 NOTE — PROGRESS NOTES
Physical Therapy    Facility/Department: [facility]  Initial Assessment    NAME: Eric Catalan  : 1926  MRN: [id number]    Date of Service: 2019  Evaluating Therapist: Jayde Llanes PT      Room #: 7954/8938-E  DIAGNOSIS: TIA  PRECAUTIONS: falls     Social:  Pt lives alone in a 1 floor plan apt (son lives next door) with 3 steps and 1 rails to enter. Prior to admission pt walked with wheeled walker. Assist with steps into apt but independent within her apt. Initial Evaluation  Date: 19 Treatment      Short Term/ Long Term   Goals   Was pt agreeable to Eval/treatment? Yes      Does pt have pain? None reported      Bed Mobility  Rolling: min A  Supine to sit: min A  Sit to supine: min A  Scooting: min A  Mod independent    Transfers Sit to stand: min A  Stand to sit: min A  Stand pivot: min A  Supervision    Ambulation    25 feet and 15 feet with wheeled walker with min A  75 feet with wheeled walker with supervision    Stair negotiation: ascended and descended N/A  3-4 steps with 1 rail with min A   AM-PAC Raw Score  15/24       Pt is alert and Oriented x 3  BLE ROM is WFL. BLE strength is grossly 4/5. Balance: sitting independent, standing min A  Sensation: grossly intact  Edema: mild edema noted B LEs  Endurance: fair   Skin was inspected: red, flaking skin noted B LEs       ASSESSMENT  Pt displays functional ability as noted in the objective portion of this evaluation. Comments/Treatment:  Pt transferred on/off commode with min A    Pt was left in chair with call light in reach. Bed/Chair alarm: yes     Patient education  Pt educated on safety with mobility in room, asking nursing with assist for all mobility. Patient response to education:   Pt verbalized understanding Pt demonstrated skill Pt requires further education in this area   Yes  Yes  Yes      Rehab potential is Good for reaching above PT goals. Pts/ family goals   1.  To return home    Patient and

## 2019-09-07 NOTE — PLAN OF CARE
Problem: Falls - Risk of:  Goal: Will remain free from falls  Description  Will remain free from falls  Outcome: Met This Shift  Goal: Absence of physical injury  Description  Absence of physical injury  Outcome: Met This Shift     Problem: ACTIVITY INTOLERANCE/IMPAIRED MOBILITY  Goal: Mobility/activity is maintained at optimum level for patient  9/6/2019 1846 by Yany Mtz RN  Outcome: Met This Shift     Problem: COMMUNICATION IMPAIRMENT  Goal: Ability to express needs and understand communication  9/6/2019 2145 by Debra Joyner RN  Outcome: Met This Shift  9/6/2019 1846 by Yany Mtz RN  Outcome: Met This Shift

## 2019-09-08 LAB
METER GLUCOSE: 153 MG/DL (ref 74–99)
METER GLUCOSE: 248 MG/DL (ref 74–99)
METER GLUCOSE: 255 MG/DL (ref 74–99)
METER GLUCOSE: 336 MG/DL (ref 74–99)

## 2019-09-08 PROCEDURE — 2580000003 HC RX 258: Performed by: FAMILY MEDICINE

## 2019-09-08 PROCEDURE — 6370000000 HC RX 637 (ALT 250 FOR IP): Performed by: FAMILY MEDICINE

## 2019-09-08 PROCEDURE — 2060000000 HC ICU INTERMEDIATE R&B

## 2019-09-08 PROCEDURE — G0378 HOSPITAL OBSERVATION PER HR: HCPCS

## 2019-09-08 PROCEDURE — 6360000002 HC RX W HCPCS: Performed by: FAMILY MEDICINE

## 2019-09-08 PROCEDURE — 82962 GLUCOSE BLOOD TEST: CPT

## 2019-09-08 PROCEDURE — 96372 THER/PROPH/DIAG INJ SC/IM: CPT

## 2019-09-08 RX ADMIN — INSULIN LISPRO 8 UNITS: 100 INJECTION, SOLUTION INTRAVENOUS; SUBCUTANEOUS at 12:21

## 2019-09-08 RX ADMIN — ENOXAPARIN SODIUM 40 MG: 40 INJECTION SUBCUTANEOUS at 08:17

## 2019-09-08 RX ADMIN — ALPRAZOLAM 0.25 MG: 0.25 TABLET ORAL at 08:18

## 2019-09-08 RX ADMIN — INSULIN LISPRO 6 UNITS: 100 INJECTION, SOLUTION INTRAVENOUS; SUBCUTANEOUS at 17:09

## 2019-09-08 RX ADMIN — Medication 10 ML: at 08:17

## 2019-09-08 RX ADMIN — GLIMEPIRIDE 4 MG: 4 TABLET ORAL at 06:49

## 2019-09-08 RX ADMIN — ALPRAZOLAM 0.25 MG: 0.25 TABLET ORAL at 20:12

## 2019-09-08 RX ADMIN — METFORMIN HYDROCHLORIDE 1000 MG: 1000 TABLET ORAL at 08:18

## 2019-09-08 RX ADMIN — INSULIN LISPRO 2 UNITS: 100 INJECTION, SOLUTION INTRAVENOUS; SUBCUTANEOUS at 08:18

## 2019-09-08 RX ADMIN — ASPIRIN 81 MG 81 MG: 81 TABLET ORAL at 20:12

## 2019-09-08 RX ADMIN — FUROSEMIDE 10 MG: 20 TABLET ORAL at 08:18

## 2019-09-08 RX ADMIN — AMLODIPINE BESYLATE 5 MG: 5 TABLET ORAL at 08:18

## 2019-09-08 RX ADMIN — INSULIN LISPRO 2 UNITS: 100 INJECTION, SOLUTION INTRAVENOUS; SUBCUTANEOUS at 21:20

## 2019-09-08 RX ADMIN — ASPIRIN 81 MG 81 MG: 81 TABLET ORAL at 08:18

## 2019-09-08 RX ADMIN — Medication 10 ML: at 20:12

## 2019-09-08 ASSESSMENT — PAIN SCALES - GENERAL
PAINLEVEL_OUTOF10: 0

## 2019-09-08 NOTE — PROGRESS NOTES
Subjective: The patient is awake and alert. C/O diarrhea. She is on Metformin. Objective:    /64   Pulse 92   Temp 97 °F (36.1 °C) (Temporal)   Resp 22   Ht 4' 9\" (1.448 m)   Wt 132 lb (59.9 kg)   SpO2 98%   BMI 28.56 kg/m²     Heart:  RRR, no murmurs, gallops, or rubs.   Lungs:  CTA bilaterally, no wheeze, rales or rhonchi  Abd: bowel sounds present, nontender, nondistended, no masses  Extrem:  No clubbing, cyanosis, or edema  Neuro:Generalized weakness    CBC with Differential:    Lab Results   Component Value Date    WBC 6.0 09/07/2019    RBC 4.98 09/07/2019    HGB 14.3 09/07/2019    HCT 43.4 09/07/2019     09/07/2019    MCV 87.1 09/07/2019    MCH 28.7 09/07/2019    MCHC 32.9 09/07/2019    RDW 12.8 09/07/2019    LYMPHOPCT 43.3 09/06/2019    MONOPCT 7.8 09/06/2019    BASOPCT 0.6 09/06/2019    MONOSABS 0.40 09/06/2019    LYMPHSABS 2.22 09/06/2019    EOSABS 0.10 09/06/2019    BASOSABS 0.03 09/06/2019     CMP:    Lab Results   Component Value Date     09/06/2019    K 4.4 09/06/2019    K 4.2 04/15/2019    CL 99 09/06/2019    CO2 27 09/06/2019    BUN 14 09/06/2019    CREATININE 0.7 09/06/2019    GFRAA >60 09/06/2019    LABGLOM >60 09/06/2019    GLUCOSE 340 09/06/2019    PROT 6.4 09/06/2019    LABALBU 3.8 09/06/2019    CALCIUM 9.0 09/06/2019    BILITOT 0.2 09/06/2019    ALKPHOS 76 09/06/2019    AST 17 09/06/2019    ALT 15 09/06/2019        Assessment:    Patient Active Problem List   Diagnosis    Chest pain    Abdominal pain    Diabetes mellitus ([facility] [address]    Hypertension    Sepsis secondary to UTI (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

## 2019-09-08 NOTE — PLAN OF CARE
Problem: Falls - Risk of:  Goal: Will remain free from falls  Description  Will remain free from falls  Outcome: Met This Shift  Goal: Absence of physical injury  Description  Absence of physical injury  Outcome: Met This Shift     Problem: Risk for Impaired Skin Integrity  Goal: Tissue integrity - skin and mucous membranes  Description  Structural intactness and normal physiological function of skin and  mucous membranes.   Outcome: Met This Shift     Problem: ACTIVITY INTOLERANCE/IMPAIRED MOBILITY  Goal: Mobility/activity is maintained at optimum level for patient  Outcome: Met This Shift     Problem: COMMUNICATION IMPAIRMENT  Goal: Ability to express needs and understand communication  Outcome: Met This Shift

## 2019-09-09 VITALS
WEIGHT: 132 LBS | TEMPERATURE: 97 F | OXYGEN SATURATION: 97 % | RESPIRATION RATE: 20 BRPM | SYSTOLIC BLOOD PRESSURE: 121 MMHG | HEIGHT: 57 IN | DIASTOLIC BLOOD PRESSURE: 69 MMHG | HEART RATE: 86 BPM | BODY MASS INDEX: 28.48 KG/M2

## 2019-09-09 PROBLEM — I63.9 ACUTE CEREBROVASCULAR ACCIDENT (CVA) (HCC): Status: RESOLVED | Noted: 2019-09-06 | Resolved: 2019-09-09

## 2019-09-09 LAB
METER GLUCOSE: 147 MG/DL (ref 74–99)
METER GLUCOSE: 342 MG/DL (ref 74–99)

## 2019-09-09 PROCEDURE — 96372 THER/PROPH/DIAG INJ SC/IM: CPT

## 2019-09-09 PROCEDURE — 6360000002 HC RX W HCPCS: Performed by: FAMILY MEDICINE

## 2019-09-09 PROCEDURE — 82962 GLUCOSE BLOOD TEST: CPT

## 2019-09-09 PROCEDURE — 6370000000 HC RX 637 (ALT 250 FOR IP): Performed by: FAMILY MEDICINE

## 2019-09-09 PROCEDURE — 97127 HC SP THER IVNTJ W/FOCUS COG FUNCJ: CPT

## 2019-09-09 PROCEDURE — 2580000003 HC RX 258: Performed by: FAMILY MEDICINE

## 2019-09-09 PROCEDURE — G0378 HOSPITAL OBSERVATION PER HR: HCPCS

## 2019-09-09 RX ORDER — ASPIRIN 81 MG/1
81 TABLET, CHEWABLE ORAL 2 TIMES DAILY
Qty: 30 TABLET | Refills: 3 | DISCHARGE
Start: 2019-09-09 | End: 2020-02-07 | Stop reason: ALTCHOICE

## 2019-09-09 RX ADMIN — ASPIRIN 81 MG 81 MG: 81 TABLET ORAL at 08:40

## 2019-09-09 RX ADMIN — AMLODIPINE BESYLATE 5 MG: 5 TABLET ORAL at 08:40

## 2019-09-09 RX ADMIN — FUROSEMIDE 10 MG: 20 TABLET ORAL at 08:40

## 2019-09-09 RX ADMIN — INSULIN LISPRO 8 UNITS: 100 INJECTION, SOLUTION INTRAVENOUS; SUBCUTANEOUS at 12:24

## 2019-09-09 RX ADMIN — ALPRAZOLAM 0.25 MG: 0.25 TABLET ORAL at 08:40

## 2019-09-09 RX ADMIN — Medication 10 ML: at 08:40

## 2019-09-09 RX ADMIN — GLIMEPIRIDE 4 MG: 4 TABLET ORAL at 06:25

## 2019-09-09 RX ADMIN — INSULIN LISPRO 2 UNITS: 100 INJECTION, SOLUTION INTRAVENOUS; SUBCUTANEOUS at 06:25

## 2019-09-09 RX ADMIN — ENOXAPARIN SODIUM 40 MG: 40 INJECTION SUBCUTANEOUS at 08:40

## 2019-09-09 ASSESSMENT — PAIN SCALES - GENERAL: PAINLEVEL_OUTOF10: 0

## 2019-09-09 NOTE — PROGRESS NOTES
Subjective: The patient is awake and alert. No problems overnight. Denies chest pain, angina, and dyspnea. Denies abdominal pain. Tolerating diet. No nausea or vomiting. Objective:    /69   Pulse 86   Temp 97 °F (36.1 °C) (Temporal)   Resp 20   Ht 4' 9\" (1.448 m)   Wt 132 lb (59.9 kg)   SpO2 97%   BMI 28.56 kg/m²     Heart:  RRR, no murmurs, gallops, or rubs.   Lungs:  CTA bilaterally, no wheeze, rales or rhonchi  Abd: bowel sounds present, nontender, nondistended, no masses  Extrem:  No clubbing, cyanosis, or edema    CBC with Differential:    Lab Results   Component Value Date    WBC 6.0 09/07/2019    RBC 4.98 09/07/2019    HGB 14.3 09/07/2019    HCT 43.4 09/07/2019     09/07/2019    MCV 87.1 09/07/2019    MCH 28.7 09/07/2019    MCHC 32.9 09/07/2019    RDW 12.8 09/07/2019    LYMPHOPCT 43.3 09/06/2019    MONOPCT 7.8 09/06/2019    BASOPCT 0.6 09/06/2019    MONOSABS 0.40 09/06/2019    LYMPHSABS 2.22 09/06/2019    EOSABS 0.10 09/06/2019    BASOSABS 0.03 09/06/2019     CMP:    Lab Results   Component Value Date     09/06/2019    K 4.4 09/06/2019    K 4.2 04/15/2019    CL 99 09/06/2019    CO2 27 09/06/2019    BUN 14 09/06/2019    CREATININE 0.7 09/06/2019    GFRAA >60 09/06/2019    LABGLOM >60 09/06/2019    GLUCOSE 340 09/06/2019    PROT 6.4 09/06/2019    LABALBU 3.8 09/06/2019    CALCIUM 9.0 09/06/2019    BILITOT 0.2 09/06/2019    ALKPHOS 76 09/06/2019    AST 17 09/06/2019    ALT 15 09/06/2019        Assessment:    Patient Active Problem List   Diagnosis    Chest pain    Abdominal pain    Diabetes mellitus ([facility] [address]    Hypertension    Sepsis secondary to UTI ([facility] [address]    Dizziness    Weakness    Gait disturbance    Contusion of hip    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

## 2019-09-09 NOTE — CARE COORDINATION
Spoke with [facility], liaison with [email or web address]. Patient has been accepted and can transition to the [facility] today. [facility] does not have a  today, unfortunately and cannot provide transportation. Call placed to [facility] to arrange [place] for transition to [facility], spoke with Wayne Chaudhari. Transportation arranged for 14:30 to transition to [email or web address]. Envelope complete and paperwork on chart. Call placed to patient's son Eduardo Thomas to notify, he will call us back for the information. Patient's nurse notified. Emily Edmond.    Return call received from Eduardo  and notified him of above plan. He is in agreement.      Emily Edmond.

## 2020-02-07 ENCOUNTER — HOSPITAL ENCOUNTER (INPATIENT)
Age: 85
LOS: 4 days | Discharge: HOME HEALTH | DRG: 300 | End: 2020-02-11
Attending: EMERGENCY MEDICINE | Admitting: FAMILY MEDICINE
Payer: MEDICARE

## 2020-02-07 ENCOUNTER — APPOINTMENT (OUTPATIENT)
Dept: GENERAL RADIOLOGY | Age: 85
DRG: 300 | End: 2020-02-07
Payer: MEDICARE

## 2020-02-07 PROBLEM — L97.509 DIABETIC FOOT ULCER WITH OSTEOMYELITIS (HCC): Status: ACTIVE | Noted: 2020-02-07

## 2020-02-07 PROBLEM — M86.9 DIABETIC FOOT ULCER WITH OSTEOMYELITIS (HCC): Status: ACTIVE | Noted: 2020-02-07

## 2020-02-07 PROBLEM — L97.523 FOOT ULCER, LEFT, WITH NECROSIS OF MUSCLE (HCC): Status: ACTIVE | Noted: 2020-02-07

## 2020-02-07 PROBLEM — I96 GANGRENE OF LEFT FOOT (HCC): Status: ACTIVE | Noted: 2020-02-07

## 2020-02-07 PROBLEM — E11.69 DIABETIC FOOT ULCER WITH OSTEOMYELITIS (HCC): Status: ACTIVE | Noted: 2020-02-07

## 2020-02-07 PROBLEM — E11.621 DIABETIC FOOT ULCER WITH OSTEOMYELITIS (HCC): Status: ACTIVE | Noted: 2020-02-07

## 2020-02-07 LAB
ANION GAP SERPL CALCULATED.3IONS-SCNC: 9 MMOL/L (ref 7–16)
BASOPHILS ABSOLUTE: 0.03 E9/L (ref 0–0.2)
BASOPHILS RELATIVE PERCENT: 0.4 % (ref 0–2)
BUN BLDV-MCNC: 17 MG/DL (ref 8–23)
CALCIUM SERPL-MCNC: 9.3 MG/DL (ref 8.6–10.2)
CHLORIDE BLD-SCNC: 97 MMOL/L (ref 98–107)
CO2: 29 MMOL/L (ref 22–29)
CREAT SERPL-MCNC: 0.7 MG/DL (ref 0.5–1)
EOSINOPHILS ABSOLUTE: 0.07 E9/L (ref 0.05–0.5)
EOSINOPHILS RELATIVE PERCENT: 0.9 % (ref 0–6)
GFR AFRICAN AMERICAN: >60
GFR NON-AFRICAN AMERICAN: >60 ML/MIN/1.73
GLUCOSE BLD-MCNC: 480 MG/DL (ref 74–99)
HCT VFR BLD CALC: 39.4 % (ref 34–48)
HEMOGLOBIN: 12.7 G/DL (ref 11.5–15.5)
IMMATURE GRANULOCYTES #: 0.03 E9/L
IMMATURE GRANULOCYTES %: 0.4 % (ref 0–5)
LACTIC ACID, SEPSIS: 1.5 MMOL/L (ref 0.5–1.9)
LYMPHOCYTES ABSOLUTE: 1.04 E9/L (ref 1.5–4)
LYMPHOCYTES RELATIVE PERCENT: 13.4 % (ref 20–42)
MCH RBC QN AUTO: 27.5 PG (ref 26–35)
MCHC RBC AUTO-ENTMCNC: 32.2 % (ref 32–34.5)
MCV RBC AUTO: 85.5 FL (ref 80–99.9)
METER GLUCOSE: 279 MG/DL (ref 74–99)
METER GLUCOSE: 79 MG/DL (ref 74–99)
MONOCYTES ABSOLUTE: 0.47 E9/L (ref 0.1–0.95)
MONOCYTES RELATIVE PERCENT: 6.1 % (ref 2–12)
NEUTROPHILS ABSOLUTE: 6.1 E9/L (ref 1.8–7.3)
NEUTROPHILS RELATIVE PERCENT: 78.8 % (ref 43–80)
PDW BLD-RTO: 12.4 FL (ref 11.5–15)
PLATELET # BLD: 204 E9/L (ref 130–450)
PMV BLD AUTO: 11.1 FL (ref 7–12)
POTASSIUM REFLEX MAGNESIUM: 5.2 MMOL/L (ref 3.5–5)
RBC # BLD: 4.61 E12/L (ref 3.5–5.5)
SODIUM BLD-SCNC: 135 MMOL/L (ref 132–146)
WBC # BLD: 7.7 E9/L (ref 4.5–11.5)

## 2020-02-07 PROCEDURE — 87070 CULTURE OTHR SPECIMN AEROBIC: CPT

## 2020-02-07 PROCEDURE — 73630 X-RAY EXAM OF FOOT: CPT

## 2020-02-07 PROCEDURE — 94760 N-INVAS EAR/PLS OXIMETRY 1: CPT

## 2020-02-07 PROCEDURE — 99285 EMERGENCY DEPT VISIT HI MDM: CPT

## 2020-02-07 PROCEDURE — 87186 SC STD MICRODIL/AGAR DIL: CPT

## 2020-02-07 PROCEDURE — 96365 THER/PROPH/DIAG IV INF INIT: CPT

## 2020-02-07 PROCEDURE — 6360000002 HC RX W HCPCS: Performed by: EMERGENCY MEDICINE

## 2020-02-07 PROCEDURE — 85025 COMPLETE CBC W/AUTO DIFF WBC: CPT

## 2020-02-07 PROCEDURE — 80048 BASIC METABOLIC PNL TOTAL CA: CPT

## 2020-02-07 PROCEDURE — 96372 THER/PROPH/DIAG INJ SC/IM: CPT

## 2020-02-07 PROCEDURE — 6370000000 HC RX 637 (ALT 250 FOR IP): Performed by: FAMILY MEDICINE

## 2020-02-07 PROCEDURE — 82962 GLUCOSE BLOOD TEST: CPT

## 2020-02-07 PROCEDURE — 2580000003 HC RX 258: Performed by: FAMILY MEDICINE

## 2020-02-07 PROCEDURE — 1200000000 HC SEMI PRIVATE

## 2020-02-07 PROCEDURE — 6360000002 HC RX W HCPCS: Performed by: FAMILY MEDICINE

## 2020-02-07 PROCEDURE — 36415 COLL VENOUS BLD VENIPUNCTURE: CPT

## 2020-02-07 PROCEDURE — 2580000003 HC RX 258: Performed by: EMERGENCY MEDICINE

## 2020-02-07 PROCEDURE — 87040 BLOOD CULTURE FOR BACTERIA: CPT

## 2020-02-07 PROCEDURE — 83605 ASSAY OF LACTIC ACID: CPT

## 2020-02-07 RX ORDER — SENNA PLUS 8.6 MG/1
1 TABLET ORAL NIGHTLY
Status: DISCONTINUED | OUTPATIENT
Start: 2020-02-07 | End: 2020-02-11 | Stop reason: HOSPADM

## 2020-02-07 RX ORDER — AMLODIPINE BESYLATE 5 MG/1
5 TABLET ORAL DAILY
Status: DISCONTINUED | OUTPATIENT
Start: 2020-02-08 | End: 2020-02-11 | Stop reason: HOSPADM

## 2020-02-07 RX ORDER — ALPRAZOLAM 0.25 MG/1
0.25 TABLET ORAL 2 TIMES DAILY
Status: DISCONTINUED | OUTPATIENT
Start: 2020-02-07 | End: 2020-02-11 | Stop reason: HOSPADM

## 2020-02-07 RX ORDER — ONDANSETRON 2 MG/ML
4 INJECTION INTRAMUSCULAR; INTRAVENOUS EVERY 6 HOURS PRN
Status: DISCONTINUED | OUTPATIENT
Start: 2020-02-07 | End: 2020-02-11 | Stop reason: HOSPADM

## 2020-02-07 RX ORDER — SODIUM CHLORIDE 0.9 % (FLUSH) 0.9 %
10 SYRINGE (ML) INJECTION EVERY 12 HOURS SCHEDULED
Status: DISCONTINUED | OUTPATIENT
Start: 2020-02-07 | End: 2020-02-11 | Stop reason: HOSPADM

## 2020-02-07 RX ORDER — SODIUM CHLORIDE 0.9 % (FLUSH) 0.9 %
10 SYRINGE (ML) INJECTION PRN
Status: DISCONTINUED | OUTPATIENT
Start: 2020-02-07 | End: 2020-02-11 | Stop reason: HOSPADM

## 2020-02-07 RX ORDER — NICOTINE POLACRILEX 4 MG
15 LOZENGE BUCCAL PRN
Status: DISCONTINUED | OUTPATIENT
Start: 2020-02-07 | End: 2020-02-11 | Stop reason: HOSPADM

## 2020-02-07 RX ORDER — GLIMEPIRIDE 4 MG/1
4 TABLET ORAL
Status: DISCONTINUED | OUTPATIENT
Start: 2020-02-08 | End: 2020-02-11 | Stop reason: HOSPADM

## 2020-02-07 RX ORDER — DEXTROSE MONOHYDRATE 50 MG/ML
100 INJECTION, SOLUTION INTRAVENOUS PRN
Status: DISCONTINUED | OUTPATIENT
Start: 2020-02-07 | End: 2020-02-11 | Stop reason: HOSPADM

## 2020-02-07 RX ORDER — ACETAMINOPHEN 325 MG/1
650 TABLET ORAL EVERY 4 HOURS PRN
Status: DISCONTINUED | OUTPATIENT
Start: 2020-02-07 | End: 2020-02-11 | Stop reason: HOSPADM

## 2020-02-07 RX ORDER — DEXTROSE MONOHYDRATE 25 G/50ML
12.5 INJECTION, SOLUTION INTRAVENOUS PRN
Status: DISCONTINUED | OUTPATIENT
Start: 2020-02-07 | End: 2020-02-11 | Stop reason: HOSPADM

## 2020-02-07 RX ORDER — SODIUM CHLORIDE 9 MG/ML
1000 INJECTION, SOLUTION INTRAVENOUS CONTINUOUS
Status: DISCONTINUED | OUTPATIENT
Start: 2020-02-07 | End: 2020-02-07

## 2020-02-07 RX ORDER — ASPIRIN 81 MG/1
81 TABLET, CHEWABLE ORAL DAILY
COMMUNITY

## 2020-02-07 RX ORDER — ASPIRIN 81 MG/1
81 TABLET, CHEWABLE ORAL DAILY
Status: DISCONTINUED | OUTPATIENT
Start: 2020-02-08 | End: 2020-02-11 | Stop reason: HOSPADM

## 2020-02-07 RX ADMIN — SODIUM CHLORIDE, PRESERVATIVE FREE 10 ML: 5 INJECTION INTRAVENOUS at 20:31

## 2020-02-07 RX ADMIN — ENOXAPARIN SODIUM 30 MG: 30 INJECTION SUBCUTANEOUS at 17:00

## 2020-02-07 RX ADMIN — INSULIN LISPRO 6 UNITS: 100 INJECTION, SOLUTION INTRAVENOUS; SUBCUTANEOUS at 17:00

## 2020-02-07 RX ADMIN — ALPRAZOLAM 0.25 MG: 0.25 TABLET ORAL at 20:29

## 2020-02-07 RX ADMIN — SODIUM CHLORIDE 1000 ML: 9 INJECTION, SOLUTION INTRAVENOUS at 15:12

## 2020-02-07 RX ADMIN — SENNOSIDES 8.6 MG: 8.6 TABLET, FILM COATED ORAL at 20:29

## 2020-02-07 RX ADMIN — PIPERACILLIN AND TAZOBACTAM 4.5 G: 4; .5 INJECTION, POWDER, LYOPHILIZED, FOR SOLUTION INTRAVENOUS at 14:00

## 2020-02-07 ASSESSMENT — PAIN SCALES - GENERAL
PAINLEVEL_OUTOF10: 0
PAINLEVEL_OUTOF10: 0

## 2020-02-07 NOTE — HOME CARE
Patient referred to [facility] for skilled nursing/wound care from [facility] on 2/6/2020. Patient hospitalized before home care could begin. Will need all new home care orders prior to discharge.  Marcia Corbin lpn

## 2020-02-07 NOTE — ED PROVIDER NOTES
HPI:  2/7/20,   Time: 11:06 AM       Bridget Barbosa is a 80 y.o. female presenting to the ED for left foot ulcer, beginning 2 months ago. The complaint has been persistent, moderate in severity, and worsened by nothing. Foot ulcer left foot, followed by dr flow podiatrideysi, on abx and cream, getting worse, now erythema to left foot. Son brought pt in. No fever, no n/v/d. Just finished pcn. Called pcp today, told to come in. No pain. Purulent drainage    Review of Systems:   Pertinent positives and negatives are stated within HPI, all other systems reviewed and are negative.          --------------------------------------------- PAST HISTORY ---------------------------------------------  Past Medical History:  has a past medical history of Diabetes mellitus ([facility] [address] Hypertension, and Unspecified cerebral artery occlusion with cerebral infarction. Past Surgical History:  has a past surgical history that includes Carotid endarterectomy (Left); eye surgery (Bilateral); Cholecystectomy (7/30/15); and Appendectomy. Social History:  reports that she has quit smoking. She has never used smokeless tobacco. She reports that she does not drink alcohol or use drugs. Family History: family history is not on file. The patients home medications have been reviewed. Allergies: Patient has no known allergies. ---------------------------------------------------PHYSICAL EXAM--------------------------------------    Constitutional/General: Alert and oriented x3, well appearing, non toxic in NAD  Head: Normocephalic and atraumatic  Eyes: PERRL, EOMI, conjunctive normal, sclera non icteric  Mouth: Oropharynx clear, handling secretions, no trismus, no asymmetry of the posterior oropharynx or uvular edema  Neck: Supple, full ROM, non tender to palpation in the midline, no stridor, no crepitus, no meningeal signs  Respiratory: Lungs clear to auscultation bilaterally, no wheezes, rales, or rhonchi.  Not in mmol/L    Potassium reflex Magnesium 5.2 (H) 3.5 - 5.0 mmol/L    Chloride 97 (L) 98 - 107 mmol/L    CO2 29 22 - 29 mmol/L    Anion Gap 9 7 - 16 mmol/L    Glucose 480 (H) 74 - 99 mg/dL    BUN 17 8 - 23 mg/dL    CREATININE 0.7 0.5 - 1.0 mg/dL    GFR Non-African American >60 >=60 mL/min/1.73    GFR African American >60     Calcium 9.3 8.6 - 10.2 mg/dL   Lactate, Sepsis   Result Value Ref Range    Lactic Acid, Sepsis 1.5 0.5 - 1.9 mmol/L       RADIOLOGY:  Interpreted by Radiologist.  XR FOOT LEFT (MIN 3 VIEWS)   Final Result   No findings specifically suggest acute osteomyelitis. Soft tissue   ulcer is noted. EKG: This EKG is signed and interpreted by the EP. Time:   Rate:   Rhythm:   Interpretation:   Comparison:       ------------------------- NURSING NOTES AND VITALS REVIEWED ---------------------------   The nursing notes within the ED encounter and vital signs as below have been reviewed by myself. BP (!) 160/87   Pulse 82   Temp 97.9 °F (36.6 °C)   Resp 18   Wt 130 lb (59 kg)   SpO2 96%   BMI 28.13 kg/m²   Oxygen Saturation Interpretation: Normal    The patients available past medical records and past encounters were reviewed. ------------------------------ ED COURSE/MEDICAL DECISION MAKING----------------------  Medications   0.9 % sodium chloride infusion (has no administration in time range)   piperacillin-tazobactam (ZOSYN) 4.5 g in sodium chloride 0.9 % 100 mL IVPB (mini-bag) (has no administration in time range)   vancomycin 1.5 g in dextrose 5% 300 mL IVPB (has no administration in time range)         ED COURSE:       Medical Decision Making:    Dry gangrenous foot with ulcer, will admit, pcp agrees, pt updated on poc      This patient's ED course included: a personal history and physicial examination    This patient has remained hemodynamically stable during their ED course. Re-Evaluations:             Re-evaluation.   Patients symptoms show no

## 2020-02-08 PROBLEM — I70.262 ATHEROSCLEROSIS OF NATIVE ARTERY OF LEFT LOWER EXTREMITY WITH GANGRENE (HCC): Chronic | Status: ACTIVE | Noted: 2020-02-08

## 2020-02-08 LAB
METER GLUCOSE: 120 MG/DL (ref 74–99)
METER GLUCOSE: 126 MG/DL (ref 74–99)
METER GLUCOSE: 354 MG/DL (ref 74–99)
METER GLUCOSE: 492 MG/DL (ref 74–99)

## 2020-02-08 PROCEDURE — 6370000000 HC RX 637 (ALT 250 FOR IP): Performed by: FAMILY MEDICINE

## 2020-02-08 PROCEDURE — 97161 PT EVAL LOW COMPLEX 20 MIN: CPT

## 2020-02-08 PROCEDURE — 96372 THER/PROPH/DIAG INJ SC/IM: CPT

## 2020-02-08 PROCEDURE — 99223 1ST HOSP IP/OBS HIGH 75: CPT | Performed by: SURGERY

## 2020-02-08 PROCEDURE — 82962 GLUCOSE BLOOD TEST: CPT

## 2020-02-08 PROCEDURE — 97530 THERAPEUTIC ACTIVITIES: CPT

## 2020-02-08 PROCEDURE — 1200000000 HC SEMI PRIVATE

## 2020-02-08 PROCEDURE — 2580000003 HC RX 258: Performed by: FAMILY MEDICINE

## 2020-02-08 PROCEDURE — 97165 OT EVAL LOW COMPLEX 30 MIN: CPT

## 2020-02-08 PROCEDURE — 97535 SELF CARE MNGMENT TRAINING: CPT

## 2020-02-08 PROCEDURE — 6360000002 HC RX W HCPCS: Performed by: FAMILY MEDICINE

## 2020-02-08 PROCEDURE — 99232 SBSQ HOSP IP/OBS MODERATE 35: CPT | Performed by: FAMILY MEDICINE

## 2020-02-08 RX ORDER — INSULIN GLARGINE 100 [IU]/ML
5 INJECTION, SOLUTION SUBCUTANEOUS ONCE
Status: COMPLETED | OUTPATIENT
Start: 2020-02-08 | End: 2020-02-08

## 2020-02-08 RX ADMIN — SODIUM CHLORIDE, PRESERVATIVE FREE 10 ML: 5 INJECTION INTRAVENOUS at 09:19

## 2020-02-08 RX ADMIN — ENOXAPARIN SODIUM 30 MG: 30 INJECTION SUBCUTANEOUS at 09:18

## 2020-02-08 RX ADMIN — ACETAMINOPHEN 650 MG: 325 TABLET, FILM COATED ORAL at 21:54

## 2020-02-08 RX ADMIN — ALPRAZOLAM 0.25 MG: 0.25 TABLET ORAL at 09:18

## 2020-02-08 RX ADMIN — SENNOSIDES 8.6 MG: 8.6 TABLET, FILM COATED ORAL at 09:18

## 2020-02-08 RX ADMIN — AMLODIPINE BESYLATE 5 MG: 5 TABLET ORAL at 09:18

## 2020-02-08 RX ADMIN — GLIMEPIRIDE 4 MG: 4 TABLET ORAL at 05:58

## 2020-02-08 RX ADMIN — INSULIN GLARGINE 5 UNITS: 100 INJECTION, SOLUTION SUBCUTANEOUS at 17:34

## 2020-02-08 RX ADMIN — ASPIRIN 81 MG 81 MG: 81 TABLET ORAL at 09:18

## 2020-02-08 RX ADMIN — SENNOSIDES 8.6 MG: 8.6 TABLET, FILM COATED ORAL at 20:08

## 2020-02-08 RX ADMIN — INSULIN LISPRO 12 UNITS: 100 INJECTION, SOLUTION INTRAVENOUS; SUBCUTANEOUS at 17:33

## 2020-02-08 RX ADMIN — ALPRAZOLAM 0.25 MG: 0.25 TABLET ORAL at 20:07

## 2020-02-08 RX ADMIN — SODIUM CHLORIDE, PRESERVATIVE FREE 10 ML: 5 INJECTION INTRAVENOUS at 20:08

## 2020-02-08 ASSESSMENT — PAIN SCALES - GENERAL
PAINLEVEL_OUTOF10: 0
PAINLEVEL_OUTOF10: 8
PAINLEVEL_OUTOF10: 0

## 2020-02-08 ASSESSMENT — PAIN DESCRIPTION - PAIN TYPE: TYPE: ACUTE PAIN

## 2020-02-08 ASSESSMENT — PAIN DESCRIPTION - ONSET: ONSET: GRADUAL

## 2020-02-08 ASSESSMENT — PAIN DESCRIPTION - LOCATION: LOCATION: RIB CAGE

## 2020-02-08 ASSESSMENT — PAIN DESCRIPTION - FREQUENCY: FREQUENCY: INTERMITTENT

## 2020-02-08 NOTE — CONSULTS
Vascular Surgery Inpatient Consultation Note      Reason for Consultation: Gangrene toes left foot    HISTORY OF PRESENT ILLNESS:                The patient is a 80 y.o. female who is admitted to the hospital for treatment of gangrene of toes of left foot. Patient is known to me from previous evaluation in the wound care center. Her son is present at bedside and contributes to history. The patient lives in a nursing facility and was recommended admission by the local podiatrist.  She is awake and alert and denies any significant pain. Vascular surgery is consulted for evaluation and treatment. IMPRESSION: Gangrene toes left foot. The patient is adamant about not having any procedures. I reviewed with her that I do not feel that there are any procedures that can reverse the gangrene of her toes. She states that she would refuse any toe amputations. I reviewed with her that she will likely face gangrene of her foot that may become infected that could become life-threatening. At that point she would require amputation as a lifesaving measure. The patient understands this and states that she would never agree to an amputation of her leg. At this point I recommend local wound care with Betadine paint to the toes. I would not recommend debridement. Okay for discharge from vascular surgery standpoint.       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, left ([facility]) L97.922    TIA (transient ischemic attack) G45.9    Foot ulcer, left, with necrosis of muscle ([address] L97.523    Diabetic foot ulcer with osteomyelitis ([facility]) E11.621, E11.69, L97.509, M86.9    Gangrene of left foot ([address] I96    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 ([facility]) I70.262       Past Medical History:   Diagnosis Date    Diabetes mellitus ([address]     Hypertension     Unspecified cerebral artery occlusion with cerebral infarction     affected right side        Past Surgical History:   Procedure Laterality Date    APPENDECTOMY      CAROTID ENDARTERECTOMY Left     CHOLECYSTECTOMY  7/30/15    Laparoscopic    EYE SURGERY Bilateral     implanted lens        Current Medications:    dextrose        sodium chloride flush, magnesium hydroxide, ondansetron, acetaminophen, glucose, dextrose, glucagon (rDNA), dextrose    vancomycin  1,500 mg Intravenous Once    ALPRAZolam  0.25 mg Oral BID    amLODIPine  5 mg Oral Daily    aspirin  81 mg Oral Daily    glimepiride  4 mg Oral QAM AC    senna  1 tablet Oral Nightly    sodium chloride flush  10 mL Intravenous 2 times per day    enoxaparin  30 mg Subcutaneous Daily        Allergies:  Patient has no known allergies. Social History     Socioeconomic History    Marital status:       Spouse name: Not on file    Number of children: Not on file    Years of education: Not on file    Highest education level: Not on file   Occupational History    Not on file   Social Needs    Financial resource strain: Not on file    Food insecurity:     Worry: Not on file     Inability: Not on file    Transportation needs:     Medical: Not on file     Non-medical: Not on file   Tobacco Use    Smoking status: Former Smoker    Smokeless tobacco: Never Used   Substance and Sexual Activity    Alcohol use: No    Drug use: No    Sexual activity: Not on file   Lifestyle    Physical activity:     Days per week: Not on file     Minutes per session: Not on file    Stress: Not on file   Relationships    Social connections:     Talks on phone: Not on file     Gets together: Not on file     Attends Taoist service: Not on file     Active member of club or organization: Not on file     Attends meetings of clubs or organizations: Not on file     Relationship status: Not on file    Intimate partner violence:     Fear of current or ex partner: Not on file     Emotionally abused: Not on file     Physically abused: Not on file     Forced sexual activity: Not on file   Other Topics Concern    Not on file   Social History Narrative    Not on file        History reviewed. No pertinent family history.     REVIEW OF SYSTEMS:      Eyes:      Blurred vision:  No [x]/Yes []               Diplopia:   No [x]/Yes []               Vision loss:       No [x]/Yes []   Ears, nose, throat:             Hearing loss:    No [x]/Yes []      Vertigo:   No [x]/Yes []                       Swallowing problem:  No [x]/Yes []               Nose bleeds:   No [x]/Yes []      Voice hoarseness:  No [x]/Yes []  Respiratory:             Cough:   No [x]/Yes []      Pleuritic chest pain:  No [x]/Yes []                        Dyspnea:   No [x]/Yes []      Wheezing:   No [x]/Yes []  Cardiovascular:             Angina:   No [x]/Yes []      Palpitations:   No [x]/Yes []          Claudication:    No [x]/Yes []      Leg swelling:   No [x]/Yes []  Gastrointestinal:             Nausea or vomiting:  No [x]/Yes []               Abdominal pain:  No [x]/Yes []                     Intestinal bleeding: No [x]/Yes []  Musculoskeletal:             Leg pain:   No [x]/Yes []      Back pain:   No [x]/Yes []                    Weakness:   No [x]/Yes []  Neurologic:             Numbness:   No [x]/Yes []      Paralysis:   No [x]/Yes []                       Headaches:   No [x]/Yes []  Hematologic, lymphatic:   Anemia:   No [x]/Yes []              Bleeding or bruising:  No [x]/Yes []              Fevers or chills: No [x]/Yes []  Endocrine:             Temp intolerance:   No [x]/Yes []                       Polydipsia, polyuria:  No [x]/Yes []  Skin:              Rash:    No [x]/Yes []      Ulcers:   No []/Yes [x]

## 2020-02-08 NOTE — H&P
toes with an  area of dry gangrene overlying the first metatarsal and also the fifth  metatarsal areas. There is no drainage. There is no smell at this  time. CHEST:  Clear to auscultation. HEART:  Regular. ABDOMEN:  Soft, nontender. NEUROLOGICAL:  She is alert and oriented and there is no focal deficits  at this point. LABORATORY DATA:  CMP is basically unremarkable. Sugar is 480. White  count 7.7, hemoglobin 12.7 at this time. DIAGNOSTIC IMPRESSION:  Dry gangrene. PLAN:  At this point, we will get Vascular's opinion, but this patient  is not going to let us do anything, and I do not disagree with her at  age 80. The area is dry. There is no seeping. There is no odor. There is no smell. There is not much pain. At this point, I am going  to expect we are not going to do much with this. We will get PT/OT and  Social Service for possible placement.         Casey King MD    D: 02/07/2020 17:13:29       T: 02/07/2020 20:14:16     /[email or web address]  Job#: [id number]     Doc#: [id number]    CC:

## 2020-02-08 NOTE — PROGRESS NOTES
on role of PT    Patient response to education:   Pt verbalized understanding Pt demonstrated skill Pt requires further education in this area   x x x     ASSESSMENT:    Comments:  Pt received in supine agreeable to PT evaluation. Pt performing all functional mobility without assistance. Pt with no gross LOB. Pt ambulates with decreased speed, step length, and step height bilaterally. Pt reports no pain throughout. Treatment:  Patient practiced and was instructed in the following treatment:     Gait training-verbal cues for proper positioning and sequencing using [employer]    Pt's/ family goals   1. Get better    Patient and or family understand(s) diagnosis, prognosis, and plan of care. yes    PLAN:    PT care will be provided in accordance with the objectives noted above. Exercises and functional mobility practice will be used as well as appropriate assistive devices or modalities to obtain goals. Patient and family education will also be administered as needed. Frequency of treatments: 2-5x/week x 1-2 weeks. Time in  1105  Time out  1135    Total Treatment Time  23 minutes     Evaluation Time includes thorough review of current medical information, gathering information on past medical history/social history and prior level of function, completion of standardized testing/informal observation of tasks, assessment of data and education on plan of care and goals.     CPT codes:  [x] Low Complexity PT evaluation 77524  [] Moderate Complexity PT evaluation 02579  [] High Complexity PT evaluation 70983  [] PT Re-evaluation 24328  [] Gait training 53804 0 minutes  [] Manual therapy 59921 0 minutes  [x] Therapeutic activities 37473 23 minutes  [] Therapeutic exercises 23701 0 minutes  [] Neuromuscular reeducation 00565 0 minutes       Lauren Wyman PT, DPT   [id number]

## 2020-02-08 NOTE — PROGRESS NOTES
Hand dominance: right        Strength ROM Additional Info:    RUE   4/5 wfl good  and wfl FMC/dexterity noted during ADL tasks      LUE 4/5 wfl good  and wfl FMC/dexterity noted during ADL tasks      Hearing: wfl  Sensation:wfl  Tone: wfl  Edema:none noted                             Comments: Upon arrival, patient supine in bed and agreeable to OT session. Pt demonstrating F understanding of education/techniques, requiring additional training / education. At end of session, patient seated in chair with call light and phone within reach. Pt would benefit from continued skilled OT to increase safety,  independence and quality of life. Treatment:  OT services provided: Facilitation of bed mobility, functional transfers (various surfaces; bed, chair, toilet), standing tolerance tasks (addressing posture, balance and activity tolerance while incorporating light functional reaching) and functional ambulation task with w/w (cuing on posture, w/w management and safety) - skilled cuing on hand placement, posture, body mechanics and safety. Therapist facilitated self-care retraining: UB/LB self-care tasks (gown, socks), simulated toileting task and standing grooming task at sink while educating pt on modified techniques, posture, safety and energy conservation techniques. Skilled monitoring of HR, O2 sats and pts response to treatment.         Eval Complexity: Low    (Evaluation time includes thorough review of current medical information, gathering information on past medical history/social history and prior level of function, completion of standardized testing/informal observation of tasks, assessment of data, and development of POC/Goals.)      Assessment of current deficits   Functional mobility [x]  ADLs [x] Strength [x]  Cognition []  Functional transfers  [x] IADLs [x] Safety Awareness [x]  Endurance [x]  Fine Motor Coordination [] Balance [x] Vision/perception [] Sensation []   Gross Motor Coordination [] ROM [] Delirium []                  Motor Control []    Plan of Care: OT for 5-7 days   ADL retraining [x]   Equipment needs [x]   Neuromuscular re-education [x] Energy Conservation Techniques [x]  Functional Transfer training [x] Patient and/or Family Education [x]  Functional Mobility training [x]  Environmental Modifications [x]  Cognitive re-training []   Compensatory techniques for ADLs [x]  Splinting Needs []   Positioning to improve overall function [x]   Therapeutic Activity [x]  Therapeutic Exercise  [x]  Visual/Perceptual: []    Delirium prevention/treatment  []   Other:  []    Rehab Potential: Good for established goals    LTG: maximize independence with ADLs to return home     Patient / Family Goal:  Not stated     Patient and/or family were instructed diagnosis, prognosis/goals and plan of care. Demonstrated fair understanding. [] Malnutrition indicators have been identified and nursing has been notified to ensure a dietitian consult is ordered.        AM-PAC Daily Activity Inpatient   How much help for putting on and taking off regular lower body clothing?: A Little  How much help for Bathing?: A Little  How much help for Toileting?: A Little  How much help for putting on and taking off regular upper body clothing?: A Little  How much help for taking care of personal grooming?: A Little  How much help for eating meals?: None  AM-[facility] Inpatient Daily Activity Raw Score: 19  AM-PAC Inpatient ADL T-Scale Score : 40.22  ADL Inpatient CMS 0-100% Score: 42.8  ADL Inpatient CMS G-Code Modifier : CK       low Evaluation +     Treatment Time In:1110          Treatment Time Out: 1135           Treatment Charges: Mins Units   Ther Ex  29002     Manual Therapy 17075     Thera Activities 88191 16 1   ADL/Home Mgt 43180 9 1   Neuro Re-ed 04957     Group Therapy      Orthotic manage/training  44754     Non-Billable Time     Total Timed Treatment 25 2             Tushar Breaker OTR/L #[id number]

## 2020-02-08 NOTE — PROGRESS NOTES
Message Dr. Karen Harris that pt's blood sugar was 492. Orders given Lantus 5 unit SQ now x1 and Humalog intermediate Sliding Scale AC/HS.

## 2020-02-09 LAB
ANION GAP SERPL CALCULATED.3IONS-SCNC: 12 MMOL/L (ref 7–16)
BUN BLDV-MCNC: 13 MG/DL (ref 8–23)
CALCIUM SERPL-MCNC: 8.9 MG/DL (ref 8.6–10.2)
CHLORIDE BLD-SCNC: 101 MMOL/L (ref 98–107)
CO2: 23 MMOL/L (ref 22–29)
CREAT SERPL-MCNC: 0.6 MG/DL (ref 0.5–1)
GFR AFRICAN AMERICAN: >60
GFR NON-AFRICAN AMERICAN: >60 ML/MIN/1.73
GLUCOSE BLD-MCNC: 194 MG/DL (ref 74–99)
HBA1C MFR BLD: 10 % (ref 4–5.6)
METER GLUCOSE: 102 MG/DL (ref 74–99)
METER GLUCOSE: 117 MG/DL (ref 74–99)
METER GLUCOSE: 224 MG/DL (ref 74–99)
METER GLUCOSE: 284 MG/DL (ref 74–99)
POTASSIUM SERPL-SCNC: 4.2 MMOL/L (ref 3.5–5)
SODIUM BLD-SCNC: 136 MMOL/L (ref 132–146)

## 2020-02-09 PROCEDURE — 2580000003 HC RX 258: Performed by: FAMILY MEDICINE

## 2020-02-09 PROCEDURE — 6370000000 HC RX 637 (ALT 250 FOR IP): Performed by: FAMILY MEDICINE

## 2020-02-09 PROCEDURE — 99232 SBSQ HOSP IP/OBS MODERATE 35: CPT | Performed by: FAMILY MEDICINE

## 2020-02-09 PROCEDURE — 96372 THER/PROPH/DIAG INJ SC/IM: CPT

## 2020-02-09 PROCEDURE — 6360000002 HC RX W HCPCS: Performed by: FAMILY MEDICINE

## 2020-02-09 PROCEDURE — 36415 COLL VENOUS BLD VENIPUNCTURE: CPT

## 2020-02-09 PROCEDURE — 80048 BASIC METABOLIC PNL TOTAL CA: CPT

## 2020-02-09 PROCEDURE — 1200000000 HC SEMI PRIVATE

## 2020-02-09 PROCEDURE — 82962 GLUCOSE BLOOD TEST: CPT

## 2020-02-09 PROCEDURE — 83036 HEMOGLOBIN GLYCOSYLATED A1C: CPT

## 2020-02-09 RX ADMIN — ACETAMINOPHEN 650 MG: 325 TABLET, FILM COATED ORAL at 20:43

## 2020-02-09 RX ADMIN — ALPRAZOLAM 0.25 MG: 0.25 TABLET ORAL at 20:42

## 2020-02-09 RX ADMIN — INSULIN LISPRO 6 UNITS: 100 INJECTION, SOLUTION INTRAVENOUS; SUBCUTANEOUS at 11:56

## 2020-02-09 RX ADMIN — ENOXAPARIN SODIUM 30 MG: 30 INJECTION SUBCUTANEOUS at 09:00

## 2020-02-09 RX ADMIN — SODIUM CHLORIDE, PRESERVATIVE FREE 10 ML: 5 INJECTION INTRAVENOUS at 09:01

## 2020-02-09 RX ADMIN — SODIUM CHLORIDE, PRESERVATIVE FREE 10 ML: 5 INJECTION INTRAVENOUS at 20:43

## 2020-02-09 RX ADMIN — AMLODIPINE BESYLATE 5 MG: 5 TABLET ORAL at 12:17

## 2020-02-09 RX ADMIN — ALPRAZOLAM 0.25 MG: 0.25 TABLET ORAL at 09:00

## 2020-02-09 RX ADMIN — ASPIRIN 81 MG 81 MG: 81 TABLET ORAL at 09:00

## 2020-02-09 RX ADMIN — INSULIN LISPRO 4 UNITS: 100 INJECTION, SOLUTION INTRAVENOUS; SUBCUTANEOUS at 17:20

## 2020-02-09 RX ADMIN — GLIMEPIRIDE 4 MG: 4 TABLET ORAL at 06:49

## 2020-02-09 RX ADMIN — SENNOSIDES 8.6 MG: 8.6 TABLET, FILM COATED ORAL at 20:42

## 2020-02-09 ASSESSMENT — PAIN SCALES - GENERAL
PAINLEVEL_OUTOF10: 0
PAINLEVEL_OUTOF10: 0
PAINLEVEL_OUTOF10: 6

## 2020-02-09 ASSESSMENT — PAIN DESCRIPTION - FREQUENCY: FREQUENCY: INTERMITTENT

## 2020-02-09 ASSESSMENT — PAIN DESCRIPTION - PAIN TYPE: TYPE: CHRONIC PAIN

## 2020-02-09 ASSESSMENT — PAIN DESCRIPTION - LOCATION: LOCATION: FOOT

## 2020-02-09 NOTE — PROGRESS NOTES
Pt's son talked to pt about the benefits of having her left toes amputated and pt has decided that she wants to go ahead and have them removed. Dr. Shadi Coles was notified and he gave order to have podiatrist consulted.

## 2020-02-09 NOTE — PROGRESS NOTES
Admit Date: 2/7/2020       Subjective:    Pt admitted for gangrene of L foot apparently wanting no surgical intervention. Had elevated glucose at 480 on admission. amaryl as outpt / getting sq coverage of 6 u , and then 4 u this am with glucose of 79 this am    Attempted to stop sliding scale but glucose shot up to 300's  Started lantus 5 u last pm and will adjust accordingly  fbs 102 this am    Son spoke to me in the hallway and said no way is he taking her home without surgery on her feet. He is going to talk her into having them removed. Pt seems like she is thinking more about surgery.  They want to discuss this with DR Barron Riddle in the morning  He also stated he would not give her insulin shots as home as well       Objective:    Scheduled Meds:  Current Facility-Administered Medications   Medication Dose Route Frequency Provider Last Rate Last Dose    insulin lispro (HUMALOG) injection vial 0-12 Units  0-12 Units Subcutaneous TID WC Braeden Davidson MD   12 Units at 02/08/20 1733    insulin lispro (HUMALOG) injection vial 0-6 Units  0-6 Units Subcutaneous Nightly Braeden Davidson MD        vancomycin 1.5 g in dextrose 5% 300 mL IVPB  1,500 mg Intravenous Once Charmaine Thomas MD        ALPRAZolam [facility]) tablet 0.25 mg  0.25 mg Oral BID Bing Hightower MD   0.25 mg at 02/08/20 2007    amLODIPine (NORVASC) tablet 5 mg  5 mg Oral Daily Bing Hightower MD   5 mg at 02/08/20 9532    aspirin chewable tablet 81 mg  81 mg Oral Daily Bing Hightower MD   81 mg at 02/08/20 1712    glimepiride (AMARYL) tablet 4 mg  4 mg Oral QAM AC Bing Hightower MD   4 mg at 02/09/20 4622    senna (SENOKOT) tablet 8.6 mg  1 tablet Oral Nightly Bing Hightower MD   8.6 mg at 02/08/20 2008    sodium chloride flush 0.9 % injection 10 mL  10 mL Intravenous 2 times per day Bing Hightower MD   10 mL at 02/08/20 2008    sodium chloride flush 0.9 % injection 10 mL  10 mL Intravenous PRN Bing Hightower MD        magnesium hydroxide (MILK OF MAGNESIA) 400 MG/5ML

## 2020-02-09 NOTE — PROGRESS NOTES
Notified Dr Lamar Daniel that pt has decided to go ahead and have her toes amputated on her left foot. Dr. Lamar Daniel said to have podiatrist notified.

## 2020-02-10 VITALS
BODY MASS INDEX: 26.21 KG/M2 | TEMPERATURE: 97.6 F | DIASTOLIC BLOOD PRESSURE: 65 MMHG | OXYGEN SATURATION: 95 % | HEART RATE: 72 BPM | SYSTOLIC BLOOD PRESSURE: 135 MMHG | WEIGHT: 130 LBS | HEIGHT: 59 IN | RESPIRATION RATE: 16 BRPM

## 2020-02-10 LAB
METER GLUCOSE: 122 MG/DL (ref 74–99)
METER GLUCOSE: 154 MG/DL (ref 74–99)
METER GLUCOSE: 195 MG/DL (ref 74–99)
METER GLUCOSE: 254 MG/DL (ref 74–99)
ORGANISM: ABNORMAL
WOUND/ABSCESS: ABNORMAL
WOUND/ABSCESS: ABNORMAL

## 2020-02-10 PROCEDURE — 2580000003 HC RX 258: Performed by: FAMILY MEDICINE

## 2020-02-10 PROCEDURE — 1200000000 HC SEMI PRIVATE

## 2020-02-10 PROCEDURE — 82962 GLUCOSE BLOOD TEST: CPT

## 2020-02-10 PROCEDURE — 6360000002 HC RX W HCPCS: Performed by: FAMILY MEDICINE

## 2020-02-10 PROCEDURE — 6370000000 HC RX 637 (ALT 250 FOR IP): Performed by: FAMILY MEDICINE

## 2020-02-10 PROCEDURE — 96372 THER/PROPH/DIAG INJ SC/IM: CPT

## 2020-02-10 RX ADMIN — ACETAMINOPHEN 650 MG: 325 TABLET, FILM COATED ORAL at 19:56

## 2020-02-10 RX ADMIN — ACETAMINOPHEN 650 MG: 325 TABLET, FILM COATED ORAL at 10:55

## 2020-02-10 RX ADMIN — ALPRAZOLAM 0.25 MG: 0.25 TABLET ORAL at 09:23

## 2020-02-10 RX ADMIN — ASPIRIN 81 MG 81 MG: 81 TABLET ORAL at 09:24

## 2020-02-10 RX ADMIN — INSULIN LISPRO 1 UNITS: 100 INJECTION, SOLUTION INTRAVENOUS; SUBCUTANEOUS at 19:59

## 2020-02-10 RX ADMIN — SENNOSIDES 8.6 MG: 8.6 TABLET, FILM COATED ORAL at 19:57

## 2020-02-10 RX ADMIN — GLIMEPIRIDE 4 MG: 4 TABLET ORAL at 06:26

## 2020-02-10 RX ADMIN — SODIUM CHLORIDE, PRESERVATIVE FREE 10 ML: 5 INJECTION INTRAVENOUS at 09:24

## 2020-02-10 RX ADMIN — ENOXAPARIN SODIUM 30 MG: 30 INJECTION SUBCUTANEOUS at 09:24

## 2020-02-10 RX ADMIN — ALPRAZOLAM 0.25 MG: 0.25 TABLET ORAL at 19:57

## 2020-02-10 RX ADMIN — INSULIN LISPRO 6 UNITS: 100 INJECTION, SOLUTION INTRAVENOUS; SUBCUTANEOUS at 16:33

## 2020-02-10 RX ADMIN — SODIUM CHLORIDE, PRESERVATIVE FREE 10 ML: 5 INJECTION INTRAVENOUS at 19:57

## 2020-02-10 RX ADMIN — INSULIN LISPRO 2 UNITS: 100 INJECTION, SOLUTION INTRAVENOUS; SUBCUTANEOUS at 11:56

## 2020-02-10 RX ADMIN — AMLODIPINE BESYLATE 5 MG: 5 TABLET ORAL at 09:23

## 2020-02-10 ASSESSMENT — PAIN SCALES - GENERAL
PAINLEVEL_OUTOF10: 9
PAINLEVEL_OUTOF10: 0
PAINLEVEL_OUTOF10: 7
PAINLEVEL_OUTOF10: 3

## 2020-02-10 ASSESSMENT — PAIN - FUNCTIONAL ASSESSMENT: PAIN_FUNCTIONAL_ASSESSMENT: PREVENTS OR INTERFERES SOME ACTIVE ACTIVITIES AND ADLS

## 2020-02-10 ASSESSMENT — PAIN DESCRIPTION - FREQUENCY: FREQUENCY: INTERMITTENT

## 2020-02-10 ASSESSMENT — PAIN DESCRIPTION - PAIN TYPE: TYPE: CHRONIC PAIN

## 2020-02-10 ASSESSMENT — PAIN DESCRIPTION - ONSET: ONSET: GRADUAL

## 2020-02-10 ASSESSMENT — PAIN DESCRIPTION - PROGRESSION: CLINICAL_PROGRESSION: GRADUALLY IMPROVING

## 2020-02-10 ASSESSMENT — PAIN DESCRIPTION - LOCATION: LOCATION: FOOT

## 2020-02-10 NOTE — PROGRESS NOTES
02/07/2020    MCV 85.5 02/07/2020    MCH 27.5 02/07/2020    MCHC 32.2 02/07/2020    RDW 12.4 02/07/2020    LYMPHOPCT 13.4 02/07/2020    MONOPCT 6.1 02/07/2020    BASOPCT 0.4 02/07/2020    MONOSABS 0.47 02/07/2020    LYMPHSABS 1.04 02/07/2020    EOSABS 0.07 02/07/2020    BASOSABS 0.03 02/07/2020     Hemoglobin/Hematocrit:    Lab Results   Component Value Date    HGB 12.7 02/07/2020    HCT 39.4 02/07/2020       Scheduled Meds:   insulin lispro  0-12 Units Subcutaneous TID WC    insulin lispro  0-6 Units Subcutaneous Nightly    vancomycin  1,500 mg Intravenous Once    ALPRAZolam  0.25 mg Oral BID    amLODIPine  5 mg Oral Daily    aspirin  81 mg Oral Daily    glimepiride  4 mg Oral QAM AC    senna  1 tablet Oral Nightly    sodium chloride flush  10 mL Intravenous 2 times per day    enoxaparin  30 mg Subcutaneous Daily     Continuous Infusions:   dextrose       PRN Meds:.sodium chloride flush, magnesium hydroxide, ondansetron, acetaminophen, glucose, dextrose, glucagon (rDNA), dextrose    Diagnostics      Assessment  1. Dry gangrene of left foot , digits 2 through 4  2.  3.    Plan  - Patient was examined and evaluated. - Reviewed patient's recent lab results and pertinent diagnostic imaging.  - Reviewed ancillary service notes. - Continue twice daily dressing of Betadine DSD  - No plans for surgery at this time. Patient insist that she does not want an amputation  - Discussed patient with Annette Centeno and Bebo Francois  - Will continue to follow patient while they are in-house.

## 2020-02-10 NOTE — PROGRESS NOTES
Patient is in no acute distress. Denies any significant pain in her LEFT foot. She is adamant about the fact that she wants no surgery. Discussed this with son at length this morning. Plan is to probably discharge in a.m. with home health care.

## 2020-02-11 PROBLEM — L97.509 DIABETIC FOOT ULCER WITH OSTEOMYELITIS (HCC): Status: RESOLVED | Noted: 2020-02-07 | Resolved: 2020-02-11

## 2020-02-11 PROBLEM — R42 DIZZINESS: Status: RESOLVED | Noted: 2017-07-20 | Resolved: 2020-02-11

## 2020-02-11 PROBLEM — M86.9 DIABETIC FOOT ULCER WITH OSTEOMYELITIS (HCC): Status: RESOLVED | Noted: 2020-02-07 | Resolved: 2020-02-11

## 2020-02-11 PROBLEM — E11.621 DIABETIC FOOT ULCER WITH OSTEOMYELITIS (HCC): Status: RESOLVED | Noted: 2020-02-07 | Resolved: 2020-02-11

## 2020-02-11 PROBLEM — E11.69 DIABETIC FOOT ULCER WITH OSTEOMYELITIS (HCC): Status: RESOLVED | Noted: 2020-02-07 | Resolved: 2020-02-11

## 2020-02-11 PROBLEM — L97.922 NON-PRESSURE CHRONIC ULCER OF LOWER LEG WITH FAT LAYER EXPOSED, LEFT (HCC): Chronic | Status: RESOLVED | Noted: 2019-05-15 | Resolved: 2020-02-11

## 2020-02-11 PROBLEM — G45.9 TIA (TRANSIENT ISCHEMIC ATTACK): Status: RESOLVED | Noted: 2019-09-06 | Resolved: 2020-02-11

## 2020-02-11 PROBLEM — L97.523 FOOT ULCER, LEFT, WITH NECROSIS OF MUSCLE (HCC): Status: RESOLVED | Noted: 2020-02-07 | Resolved: 2020-02-11

## 2020-02-11 LAB — METER GLUCOSE: 104 MG/DL (ref 74–99)

## 2020-02-11 PROCEDURE — 6370000000 HC RX 637 (ALT 250 FOR IP): Performed by: FAMILY MEDICINE

## 2020-02-11 PROCEDURE — 82962 GLUCOSE BLOOD TEST: CPT

## 2020-02-11 PROCEDURE — 97535 SELF CARE MNGMENT TRAINING: CPT

## 2020-02-11 PROCEDURE — 97530 THERAPEUTIC ACTIVITIES: CPT

## 2020-02-11 RX ADMIN — GLIMEPIRIDE 4 MG: 4 TABLET ORAL at 05:59

## 2020-02-11 RX ADMIN — ALPRAZOLAM 0.25 MG: 0.25 TABLET ORAL at 09:20

## 2020-02-11 RX ADMIN — AMLODIPINE BESYLATE 5 MG: 5 TABLET ORAL at 09:20

## 2020-02-11 RX ADMIN — ASPIRIN 81 MG 81 MG: 81 TABLET ORAL at 09:20

## 2020-02-11 NOTE — CARE COORDINATION
Discussed home care with the pt and her son. She would like [facility], as she is active with them. Referral made. The Plan for Transition of Care is related to the following treatment goals: To be able to perform ADLs    The Patient and patient representative, Trinidad Jansen, was provided with a choice of provider and agrees   with the discharge plan. [x] Yes [] No    Freedom of choice list was provided with basic dialogue that supports the patient's individualized plan of care/goals, treatment preferences and shares the quality data associated with the providers.  [x] Yes [] No

## 2020-02-11 NOTE — PROGRESS NOTES
w/w SBA  Home distance using w/w  Mod I    Balance Sitting:     Static:  supervision    Dynamic:SBA  Standing: SBA Sitting:     Static:  supervision    Dynamic:SBA  Standing: SBA      Activity Tolerance F    Fair+  F+   Visual/  Perceptual wfl                        Comments: Upon arrival pt supine in bed. Pt educated on techniques to increase independence and safety during ADL's, bed mobility, and functional transfers. Discussed home set up with pt giving suggestions to increase safety at discharge. At end of session pt left seated in bedside chair, call light within reach, son present. · Pt has made good progress towards set goals.      · Continue with current plan of care    Treatment Time In: 8:33            Treatment Time Out: 8:57             Treatment Charges: Mins Units   Ther Ex  01695     Manual Therapy [id number]     Thera Activities 55487 10 1   ADL/Home Mgt 49005 14 1   Neuro Re-ed 03407     Group Therapy      Orthotic manage/training  00196     Non-Billable Time     Total Timed Treatment 24 2       Jeremy Dias

## 2020-02-11 NOTE — PROGRESS NOTES
Subjective: The patient is awake and alert. No problems overnight. Denies chest pain, angina, and dyspnea. Denies abdominal pain. Tolerating diet. No nausea or vomiting. Objective:    /65   Pulse 72   Temp 97.6 °F (36.4 °C) (Temporal)   Resp 16   Ht 4' 11.06\" (1.5 m)   Wt 130 lb (59 kg)   SpO2 95%   BMI 26.21 kg/m²     Heart:  RRR, no murmurs, gallops, or rubs.   Lungs:  CTA bilaterally, no wheeze, rales or rhonchi  Abd: bowel sounds present, nontender, nondistended, no masses  Extrem:  No clubbing, cyanosis, or edema dry gangrene left foot    CBC with Differential:    Lab Results   Component Value Date    WBC 7.7 02/07/2020    RBC 4.61 02/07/2020    HGB 12.7 02/07/2020    HCT 39.4 02/07/2020     02/07/2020    MCV 85.5 02/07/2020    MCH 27.5 02/07/2020    MCHC 32.2 02/07/2020    RDW 12.4 02/07/2020    LYMPHOPCT 13.4 02/07/2020    MONOPCT 6.1 02/07/2020    BASOPCT 0.4 02/07/2020    MONOSABS 0.47 02/07/2020    LYMPHSABS 1.04 02/07/2020    EOSABS 0.07 02/07/2020    BASOSABS 0.03 02/07/2020     CMP:    Lab Results   Component Value Date     02/09/2020    K 4.2 02/09/2020    K 5.2 02/07/2020     02/09/2020    CO2 23 02/09/2020    BUN 13 02/09/2020    CREATININE 0.6 02/09/2020    GFRAA >60 02/09/2020    LABGLOM >60 02/09/2020    GLUCOSE 194 02/09/2020    PROT 6.4 09/06/2019    LABALBU 3.8 09/06/2019    CALCIUM 8.9 02/09/2020    BILITOT 0.2 09/06/2019    ALKPHOS 76 09/06/2019    AST 17 09/06/2019    ALT 15 09/06/2019        Assessment:    Patient Active Problem List   Diagnosis    [facility] mellitus [address] [address]    Hypertension    Weakness    Gait disturbance    Gangrene of left foot ([address]    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

## 2020-02-12 LAB
BLOOD CULTURE, ROUTINE: NORMAL
CULTURE, BLOOD 2: NORMAL

## 2020-02-14 NOTE — ADT AUTH CERT
MEDICATIONS: XANAX 2 TIMES DAILY, NORVASC DAILY, ASPIRIN DAILY, LOVENOX DAILY, AMARYL DAILY BEFORE BREAKFAST, AMARYL DAILY BEFORE BREAKFAST, HUMALOG SS 3 TIMES DAILY WITH  MEALS AND NIGHTLY, SENOKOT NIGHTLY, TYLENOL PRN X1      ** ** FAMILY MEDICINE ** **    PLAN:They wish to discull further surgery with PCP      ** ** PODIATRY ** **    PLAN:   -Patient evaluated and chart reviewed - which includes all relevant imaging, labs, and ancillary notes. -XR reviewed. No signs of OM within left foot digits.   -Consider ordering NIVS. If patient continues to elect for non-surgical care this test may be unneccessary. -WBC 7.7 today. -Dressings consisting of: betadine DSD was applied. To be changed twice daily by nursing staff.   -Plans for surgery: none at this time. Discussed with patient and she does not currently want an amputation, nor do we currently think she would heal any distal amputations.    -Continue local wound care.    -Will continue to follow while in hospital.

## 2020-02-16 ENCOUNTER — APPOINTMENT (OUTPATIENT)
Dept: GENERAL RADIOLOGY | Age: 85
DRG: 194 | End: 2020-02-16
Payer: MEDICARE

## 2020-02-16 ENCOUNTER — APPOINTMENT (OUTPATIENT)
Dept: CT IMAGING | Age: 85
DRG: 194 | End: 2020-02-16
Payer: MEDICARE

## 2020-02-16 ENCOUNTER — HOSPITAL ENCOUNTER (INPATIENT)
Age: 85
LOS: 3 days | Discharge: NURSING, REHAB OR LONG-TERM CARE | DRG: 194 | End: 2020-02-19
Attending: EMERGENCY MEDICINE | Admitting: FAMILY MEDICINE
Payer: MEDICARE

## 2020-02-16 PROBLEM — J10.1 INFLUENZA A: Status: ACTIVE | Noted: 2020-02-16

## 2020-02-16 LAB
ALBUMIN SERPL-MCNC: 2.9 G/DL (ref 3.5–5.2)
ALP BLD-CCNC: 63 U/L (ref 35–104)
ALT SERPL-CCNC: 11 U/L (ref 0–32)
ANION GAP SERPL CALCULATED.3IONS-SCNC: 14 MMOL/L (ref 7–16)
AST SERPL-CCNC: 18 U/L (ref 0–31)
BACTERIA: ABNORMAL /HPF
BASOPHILS ABSOLUTE: 0.02 E9/L (ref 0–0.2)
BASOPHILS RELATIVE PERCENT: 0.2 % (ref 0–2)
BILIRUB SERPL-MCNC: 0.3 MG/DL (ref 0–1.2)
BILIRUBIN URINE: NEGATIVE
BLOOD, URINE: NEGATIVE
BUN BLDV-MCNC: 14 MG/DL (ref 8–23)
CALCIUM SERPL-MCNC: 8.5 MG/DL (ref 8.6–10.2)
CHLORIDE BLD-SCNC: 94 MMOL/L (ref 98–107)
CLARITY: CLEAR
CO2: 24 MMOL/L (ref 22–29)
COLOR: YELLOW
CREAT SERPL-MCNC: 0.6 MG/DL (ref 0.5–1)
EKG ATRIAL RATE: 102 BPM
EKG P AXIS: 73 DEGREES
EKG P-R INTERVAL: 154 MS
EKG Q-T INTERVAL: 356 MS
EKG QRS DURATION: 72 MS
EKG QTC CALCULATION (BAZETT): 463 MS
EKG R AXIS: 10 DEGREES
EKG T AXIS: 86 DEGREES
EKG VENTRICULAR RATE: 102 BPM
EOSINOPHILS ABSOLUTE: 0.06 E9/L (ref 0.05–0.5)
EOSINOPHILS RELATIVE PERCENT: 0.6 % (ref 0–6)
GFR AFRICAN AMERICAN: >60
GFR NON-AFRICAN AMERICAN: >60 ML/MIN/1.73
GLUCOSE BLD-MCNC: 191 MG/DL (ref 74–99)
GLUCOSE URINE: 500 MG/DL
HCT VFR BLD CALC: 39.8 % (ref 34–48)
HEMOGLOBIN: 13.1 G/DL (ref 11.5–15.5)
IMMATURE GRANULOCYTES #: 0.03 E9/L
IMMATURE GRANULOCYTES %: 0.3 % (ref 0–5)
INFLUENZA A BY PCR: DETECTED
INFLUENZA B BY PCR: NOT DETECTED
KETONES, URINE: NEGATIVE MG/DL
LEUKOCYTE ESTERASE, URINE: NEGATIVE
LYMPHOCYTES ABSOLUTE: 0.94 E9/L (ref 1.5–4)
LYMPHOCYTES RELATIVE PERCENT: 9.5 % (ref 20–42)
MCH RBC QN AUTO: 27.6 PG (ref 26–35)
MCHC RBC AUTO-ENTMCNC: 32.9 % (ref 32–34.5)
MCV RBC AUTO: 83.8 FL (ref 80–99.9)
MONOCYTES ABSOLUTE: 0.76 E9/L (ref 0.1–0.95)
MONOCYTES RELATIVE PERCENT: 7.6 % (ref 2–12)
NEUTROPHILS ABSOLUTE: 8.13 E9/L (ref 1.8–7.3)
NEUTROPHILS RELATIVE PERCENT: 81.8 % (ref 43–80)
NITRITE, URINE: NEGATIVE
PDW BLD-RTO: 12.9 FL (ref 11.5–15)
PH UA: 5.5 (ref 5–9)
PLATELET # BLD: 165 E9/L (ref 130–450)
PMV BLD AUTO: 10.9 FL (ref 7–12)
POTASSIUM SERPL-SCNC: 4.3 MMOL/L (ref 3.5–5)
PRO-BNP: 716 PG/ML (ref 0–450)
PROCALCITONIN: 0.15 NG/ML (ref 0–0.08)
PROTEIN UA: 100 MG/DL
RBC # BLD: 4.75 E12/L (ref 3.5–5.5)
RBC UA: ABNORMAL /HPF (ref 0–2)
REASON FOR REJECTION: NORMAL
REJECTED TEST: NORMAL
SODIUM BLD-SCNC: 132 MMOL/L (ref 132–146)
SPECIFIC GRAVITY UA: >=1.03 (ref 1–1.03)
TOTAL PROTEIN: 6.2 G/DL (ref 6.4–8.3)
TROPONIN: <0.01 NG/ML (ref 0–0.03)
UROBILINOGEN, URINE: 0.2 E.U./DL
WBC # BLD: 9.9 E9/L (ref 4.5–11.5)
WBC UA: ABNORMAL /HPF (ref 0–5)

## 2020-02-16 PROCEDURE — 85025 COMPLETE CBC W/AUTO DIFF WBC: CPT

## 2020-02-16 PROCEDURE — 1200000000 HC SEMI PRIVATE

## 2020-02-16 PROCEDURE — 6370000000 HC RX 637 (ALT 250 FOR IP): Performed by: EMERGENCY MEDICINE

## 2020-02-16 PROCEDURE — 87502 INFLUENZA DNA AMP PROBE: CPT

## 2020-02-16 PROCEDURE — 2580000003 HC RX 258: Performed by: RADIOLOGY

## 2020-02-16 PROCEDURE — 84484 ASSAY OF TROPONIN QUANT: CPT

## 2020-02-16 PROCEDURE — 80053 COMPREHEN METABOLIC PANEL: CPT

## 2020-02-16 PROCEDURE — 6370000000 HC RX 637 (ALT 250 FOR IP): Performed by: FAMILY MEDICINE

## 2020-02-16 PROCEDURE — 71045 X-RAY EXAM CHEST 1 VIEW: CPT

## 2020-02-16 PROCEDURE — 93010 ELECTROCARDIOGRAM REPORT: CPT | Performed by: INTERNAL MEDICINE

## 2020-02-16 PROCEDURE — 99285 EMERGENCY DEPT VISIT HI MDM: CPT

## 2020-02-16 PROCEDURE — 87088 URINE BACTERIA CULTURE: CPT

## 2020-02-16 PROCEDURE — 83880 ASSAY OF NATRIURETIC PEPTIDE: CPT

## 2020-02-16 PROCEDURE — 87040 BLOOD CULTURE FOR BACTERIA: CPT

## 2020-02-16 PROCEDURE — 71275 CT ANGIOGRAPHY CHEST: CPT

## 2020-02-16 PROCEDURE — 73502 X-RAY EXAM HIP UNI 2-3 VIEWS: CPT

## 2020-02-16 PROCEDURE — 93005 ELECTROCARDIOGRAM TRACING: CPT | Performed by: STUDENT IN AN ORGANIZED HEALTH CARE EDUCATION/TRAINING PROGRAM

## 2020-02-16 PROCEDURE — 84145 PROCALCITONIN (PCT): CPT

## 2020-02-16 PROCEDURE — 2700000000 HC OXYGEN THERAPY PER DAY

## 2020-02-16 PROCEDURE — 81001 URINALYSIS AUTO W/SCOPE: CPT

## 2020-02-16 PROCEDURE — 2580000003 HC RX 258: Performed by: STUDENT IN AN ORGANIZED HEALTH CARE EDUCATION/TRAINING PROGRAM

## 2020-02-16 PROCEDURE — 6360000004 HC RX CONTRAST MEDICATION: Performed by: RADIOLOGY

## 2020-02-16 PROCEDURE — 36415 COLL VENOUS BLD VENIPUNCTURE: CPT

## 2020-02-16 PROCEDURE — 2580000003 HC RX 258: Performed by: FAMILY MEDICINE

## 2020-02-16 RX ORDER — GLIMEPIRIDE 4 MG/1
4 TABLET ORAL
Status: DISCONTINUED | OUTPATIENT
Start: 2020-02-17 | End: 2020-02-19 | Stop reason: HOSPADM

## 2020-02-16 RX ORDER — 0.9 % SODIUM CHLORIDE 0.9 %
500 INTRAVENOUS SOLUTION INTRAVENOUS ONCE
Status: COMPLETED | OUTPATIENT
Start: 2020-02-16 | End: 2020-02-16

## 2020-02-16 RX ORDER — SENNA PLUS 8.6 MG/1
1 TABLET ORAL NIGHTLY
Status: DISCONTINUED | OUTPATIENT
Start: 2020-02-16 | End: 2020-02-19 | Stop reason: HOSPADM

## 2020-02-16 RX ORDER — SODIUM CHLORIDE 450 MG/100ML
INJECTION, SOLUTION INTRAVENOUS CONTINUOUS
Status: DISCONTINUED | OUTPATIENT
Start: 2020-02-16 | End: 2020-02-19

## 2020-02-16 RX ORDER — ASPIRIN 81 MG/1
81 TABLET, CHEWABLE ORAL DAILY
Status: DISCONTINUED | OUTPATIENT
Start: 2020-02-17 | End: 2020-02-19 | Stop reason: HOSPADM

## 2020-02-16 RX ORDER — OSELTAMIVIR PHOSPHATE 75 MG/1
75 CAPSULE ORAL ONCE
Status: COMPLETED | OUTPATIENT
Start: 2020-02-16 | End: 2020-02-16

## 2020-02-16 RX ORDER — AMLODIPINE BESYLATE 5 MG/1
5 TABLET ORAL DAILY
Status: DISCONTINUED | OUTPATIENT
Start: 2020-02-17 | End: 2020-02-19 | Stop reason: HOSPADM

## 2020-02-16 RX ORDER — OSELTAMIVIR PHOSPHATE 30 MG/1
30 CAPSULE ORAL 2 TIMES DAILY
Status: DISCONTINUED | OUTPATIENT
Start: 2020-02-17 | End: 2020-02-19 | Stop reason: HOSPADM

## 2020-02-16 RX ORDER — ONDANSETRON 2 MG/ML
4 INJECTION INTRAMUSCULAR; INTRAVENOUS EVERY 6 HOURS PRN
Status: DISCONTINUED | OUTPATIENT
Start: 2020-02-16 | End: 2020-02-19 | Stop reason: HOSPADM

## 2020-02-16 RX ORDER — ACETAMINOPHEN 325 MG/1
650 TABLET ORAL EVERY 4 HOURS PRN
Status: DISCONTINUED | OUTPATIENT
Start: 2020-02-16 | End: 2020-02-19 | Stop reason: HOSPADM

## 2020-02-16 RX ORDER — ALPRAZOLAM 0.25 MG/1
0.25 TABLET ORAL 2 TIMES DAILY PRN
Status: DISCONTINUED | OUTPATIENT
Start: 2020-02-16 | End: 2020-02-19 | Stop reason: HOSPADM

## 2020-02-16 RX ORDER — SODIUM CHLORIDE 0.9 % (FLUSH) 0.9 %
10 SYRINGE (ML) INJECTION
Status: COMPLETED | OUTPATIENT
Start: 2020-02-16 | End: 2020-02-16

## 2020-02-16 RX ORDER — AZITHROMYCIN 250 MG/1
TABLET, FILM COATED ORAL
Status: ON HOLD | COMMUNITY
Start: 2020-02-14 | End: 2020-02-19 | Stop reason: HOSPADM

## 2020-02-16 RX ADMIN — SENNOSIDES 8.6 MG: 8.6 TABLET, FILM COATED ORAL at 22:06

## 2020-02-16 RX ADMIN — SODIUM CHLORIDE: 4.5 INJECTION, SOLUTION INTRAVENOUS at 21:58

## 2020-02-16 RX ADMIN — IOPAMIDOL 60 ML: 755 INJECTION, SOLUTION INTRAVENOUS at 17:15

## 2020-02-16 RX ADMIN — ACETAMINOPHEN 650 MG: 325 TABLET ORAL at 22:06

## 2020-02-16 RX ADMIN — SODIUM CHLORIDE 500 ML: 9 INJECTION, SOLUTION INTRAVENOUS at 14:33

## 2020-02-16 RX ADMIN — OSELTAMIVIR PHOSPHATE 75 MG: 75 CAPSULE ORAL at 20:19

## 2020-02-16 RX ADMIN — Medication 10 ML: at 17:15

## 2020-02-16 RX ADMIN — ALPRAZOLAM 0.25 MG: 0.25 TABLET ORAL at 22:06

## 2020-02-16 ASSESSMENT — ENCOUNTER SYMPTOMS
NAUSEA: 0
DIARRHEA: 0
VOMITING: 0
SORE THROAT: 0
WHEEZING: 0
EYE DISCHARGE: 0
SHORTNESS OF BREATH: 0
EYE PAIN: 0
BACK PAIN: 0
COUGH: 0
EYE REDNESS: 0
SINUS PRESSURE: 0
ABDOMINAL DISTENTION: 0

## 2020-02-16 ASSESSMENT — PAIN DESCRIPTION - ORIENTATION: ORIENTATION: RIGHT

## 2020-02-16 ASSESSMENT — PAIN DESCRIPTION - PROGRESSION: CLINICAL_PROGRESSION: NOT CHANGED

## 2020-02-16 ASSESSMENT — PAIN DESCRIPTION - FREQUENCY: FREQUENCY: INTERMITTENT

## 2020-02-16 ASSESSMENT — PAIN SCALES - GENERAL
PAINLEVEL_OUTOF10: 0
PAINLEVEL_OUTOF10: 10
PAINLEVEL_OUTOF10: 0

## 2020-02-16 ASSESSMENT — PAIN DESCRIPTION - ONSET: ONSET: ON-GOING

## 2020-02-16 ASSESSMENT — PAIN DESCRIPTION - LOCATION: LOCATION: HIP

## 2020-02-16 ASSESSMENT — PAIN DESCRIPTION - DESCRIPTORS: DESCRIPTORS: SHOOTING;SORE;THROBBING

## 2020-02-16 ASSESSMENT — PAIN - FUNCTIONAL ASSESSMENT: PAIN_FUNCTIONAL_ASSESSMENT: PREVENTS OR INTERFERES SOME ACTIVE ACTIVITIES AND ADLS

## 2020-02-16 ASSESSMENT — PAIN DESCRIPTION - PAIN TYPE: TYPE: CHRONIC PAIN

## 2020-02-16 NOTE — ED NOTES
Bed: 12  Expected date:   Expected time:   Means of arrival:   Comments:  [id number] [place]  02/16/20 6703

## 2020-02-16 NOTE — ED PROVIDER NOTES
Detected   CBC Auto Differential   Result Value Ref Range    WBC 9.9 4.5 - 11.5 E9/L    RBC 4.75 3.50 - 5.50 E12/L    Hemoglobin 13.1 11.5 - 15.5 g/dL    Hematocrit 39.8 34.0 - 48.0 %    MCV 83.8 80.0 - 99.9 fL    MCH 27.6 26.0 - 35.0 pg    MCHC 32.9 32.0 - 34.5 %    RDW 12.9 11.5 - 15.0 fL    Platelets 110 131 - 267 E9/L    MPV 10.9 7.0 - 12.0 fL    Neutrophils % 81.8 (H) 43.0 - 80.0 %    Immature Granulocytes % 0.3 0.0 - 5.0 %    Lymphocytes % 9.5 (L) 20.0 - 42.0 %    Monocytes % 7.6 2.0 - 12.0 %    Eosinophils % 0.6 0.0 - 6.0 %    Basophils % 0.2 0.0 - 2.0 %    Neutrophils Absolute 8.13 (H) 1.80 - 7.30 E9/L    Immature Granulocytes # 0.03 E9/L    Lymphocytes Absolute 0.94 (L) 1.50 - 4.00 E9/L    Monocytes Absolute 0.76 0.10 - 0.95 E9/L    Eosinophils Absolute 0.06 0.05 - 0.50 E9/L    Basophils Absolute 0.02 0.00 - 0.20 E9/L   Urinalysis, reflex to microscopic   Result Value Ref Range    Color, UA Yellow Straw/Yellow    Clarity, UA Clear Clear    Glucose, Ur 500 (A) Negative mg/dL    Bilirubin Urine Negative Negative    Ketones, Urine Negative Negative mg/dL    Specific Gravity, UA >=1.030 1.005 - 1.030    Blood, Urine Negative Negative    pH, UA 5.5 5.0 - 9.0    Protein,  (A) Negative mg/dL    Urobilinogen, Urine 0.2 <2.0 E.U./dL    Nitrite, Urine Negative Negative    Leukocyte Esterase, Urine Negative Negative   Microscopic Urinalysis   Result Value Ref Range    WBC, UA 5-10 0 - 5 /HPF    RBC, UA NONE 0 - 2 /HPF    Bacteria, UA RARE (A) None Seen /HPF   SPECIMEN REJECTION   Result Value Ref Range    Rejected Test bnp cmp trop     Reason for Rejection see below    Comprehensive metabolic panel   Result Value Ref Range    Sodium 132 132 - 146 mmol/L    Potassium 4.3 3.5 - 5.0 mmol/L    Chloride 94 (L) 98 - 107 mmol/L    CO2 24 22 - 29 mmol/L    Anion Gap 14 7 - 16 mmol/L    Glucose 191 (H) 74 - 99 mg/dL    BUN 14 8 - 23 mg/dL    CREATININE 0.6 0.5 - 1.0 mg/dL    GFR Non-African American >60 >=60 mL/min/1.73 GFR African American >60     Calcium 8.5 (L) 8.6 - 10.2 mg/dL    Total Protein 6.2 (L) 6.4 - 8.3 g/dL    Alb 2.9 (L) 3.5 - 5.2 g/dL    Total Bilirubin 0.3 0.0 - 1.2 mg/dL    Alkaline Phosphatase 63 35 - 104 U/L    ALT 11 0 - 32 U/L    AST 18 0 - 31 U/L   Procalcitonin   Result Value Ref Range    Procalcitonin 0.15 (H) 0.00 - 0.08 ng/mL   Troponin   Result Value Ref Range    Troponin <0.01 0.00 - 0.03 ng/mL   Brain Natriuretic Peptide   Result Value Ref Range    Pro- (H) 0 - 450 pg/mL   CBC auto differential   Result Value Ref Range    WBC 9.0 4.5 - 11.5 E9/L    RBC 4.17 3.50 - 5.50 E12/L    Hemoglobin 11.4 (L) 11.5 - 15.5 g/dL    Hematocrit 35.1 34.0 - 48.0 %    MCV 84.2 80.0 - 99.9 fL    MCH 27.3 26.0 - 35.0 pg    MCHC 32.5 32.0 - 34.5 %    RDW 12.7 11.5 - 15.0 fL    Platelets 223 591 - 317 E9/L    MPV 10.4 7.0 - 12.0 fL    Neutrophils % 78.2 43.0 - 80.0 %    Immature Granulocytes % 0.4 0.0 - 5.0 %    Lymphocytes % 12.9 (L) 20.0 - 42.0 %    Monocytes % 7.9 2.0 - 12.0 %    Eosinophils % 0.4 0.0 - 6.0 %    Basophils % 0.2 0.0 - 2.0 %    Neutrophils Absolute 7.01 1.80 - 7.30 E9/L    Immature Granulocytes # 0.04 E9/L    Lymphocytes Absolute 1.16 (L) 1.50 - 4.00 E9/L    Monocytes Absolute 0.71 0.10 - 0.95 E9/L    Eosinophils Absolute 0.04 (L) 0.05 - 0.50 E9/L    Basophils Absolute 0.02 0.00 - 0.20 E9/L   Comprehensive Metabolic Panel w/ Reflex to MG   Result Value Ref Range    Sodium 134 132 - 146 mmol/L    Potassium reflex Magnesium 3.7 3.5 - 5.0 mmol/L    Chloride 97 (L) 98 - 107 mmol/L    CO2 25 22 - 29 mmol/L    Anion Gap 12 7 - 16 mmol/L    Glucose 139 (H) 74 - 99 mg/dL    BUN 11 8 - 23 mg/dL    CREATININE 0.7 0.5 - 1.0 mg/dL    GFR Non-African American >60 >=60 mL/min/1.73    GFR African American >60     Calcium 8.0 (L) 8.6 - 10.2 mg/dL    Total Protein 5.7 (L) 6.4 - 8.3 g/dL    Alb 2.8 (L) 3.5 - 5.2 g/dL    Total Bilirubin 0.3 0.0 - 1.2 mg/dL    Alkaline Phosphatase 58 35 - 104 U/L    ALT 9 0 - 32 U/L AST 17 0 - 31 U/L   EKG 12 Lead   Result Value Ref Range    Ventricular Rate 102 BPM    Atrial Rate 102 BPM    P-R Interval 154 ms    QRS Duration 72 ms    Q-T Interval 356 ms    QTc Calculation (Bazett) 463 ms    P Axis 73 degrees    R Axis 10 degrees    T Axis 86 degrees       RADIOLOGY:  CTA PULMONARY W CONTRAST   Final Result   1. No evidence of acute pulmonary embolic disease. 2. Opacity at the right lower lobe which is likely largely if not   entirely atelectasis. However, there does appear to be bronchial wall   thickening and possible mucous plugging suggesting bronchitis. 3. Somewhat bulky subcarinal adenopathy of unclear significance. This   does appear somewhat more prominent than was the case on the prior   study of 28 July 2015. XR HIP 2-3 VW W PELVIS LEFT   Final Result   1. Osseous demineralization without evidence of displaced fracture. 2.  Degenerative changes of the hips and lumbar spine. XR CHEST PORTABLE   Final Result      Prominent interstitial markings, seen with interstitial pneumonitis,   pulmonary edema, or age-related changes. No consolidations.                        ------------------------- NURSING NOTES AND VITALS REVIEWED ---------------------------  Date / Time Roomed:  2/16/2020 12:43 PM  ED Bed Assignment:  [id number]    The nursing notes within the ED encounter and vital signs as below have been reviewed.      Patient Vitals for the past 24 hrs:   BP Temp Temp src Pulse Resp SpO2 Height Weight   02/17/20 0302 (!) 196/77 98.3 °F (36.8 °C) Temporal 85 17 97 % -- --   02/16/20 2315 (!) 155/70 98 °F (36.7 °C) Temporal 73 16 96 % -- --   02/16/20 2212 (!) 168/71 98 °F (36.7 °C) Temporal 89 16 98 % -- --   02/16/20 2046 (!) 174/86 98 °F (36.7 °C) Oral 94 18 96 % -- --   02/16/20 1938 (!) 165/92 -- -- 93 18 96 % -- --   02/16/20 1247 (!) 163/82 97.8 °F (36.6 °C) Oral 78 18 92 % 4' 9\" (1.448 m) 129 lb (58.5 kg)       Oxygen Saturation Interpretation:

## 2020-02-17 LAB
ALBUMIN SERPL-MCNC: 2.8 G/DL (ref 3.5–5.2)
ALP BLD-CCNC: 58 U/L (ref 35–104)
ALT SERPL-CCNC: 9 U/L (ref 0–32)
ANION GAP SERPL CALCULATED.3IONS-SCNC: 12 MMOL/L (ref 7–16)
AST SERPL-CCNC: 17 U/L (ref 0–31)
BASOPHILS ABSOLUTE: 0.02 E9/L (ref 0–0.2)
BASOPHILS RELATIVE PERCENT: 0.2 % (ref 0–2)
BILIRUB SERPL-MCNC: 0.3 MG/DL (ref 0–1.2)
BUN BLDV-MCNC: 11 MG/DL (ref 8–23)
CALCIUM SERPL-MCNC: 8 MG/DL (ref 8.6–10.2)
CHLORIDE BLD-SCNC: 97 MMOL/L (ref 98–107)
CO2: 25 MMOL/L (ref 22–29)
CREAT SERPL-MCNC: 0.7 MG/DL (ref 0.5–1)
EOSINOPHILS ABSOLUTE: 0.04 E9/L (ref 0.05–0.5)
EOSINOPHILS RELATIVE PERCENT: 0.4 % (ref 0–6)
GFR AFRICAN AMERICAN: >60
GFR NON-AFRICAN AMERICAN: >60 ML/MIN/1.73
GLUCOSE BLD-MCNC: 139 MG/DL (ref 74–99)
HCT VFR BLD CALC: 35.1 % (ref 34–48)
HEMOGLOBIN: 11.4 G/DL (ref 11.5–15.5)
IMMATURE GRANULOCYTES #: 0.04 E9/L
IMMATURE GRANULOCYTES %: 0.4 % (ref 0–5)
LYMPHOCYTES ABSOLUTE: 1.16 E9/L (ref 1.5–4)
LYMPHOCYTES RELATIVE PERCENT: 12.9 % (ref 20–42)
MCH RBC QN AUTO: 27.3 PG (ref 26–35)
MCHC RBC AUTO-ENTMCNC: 32.5 % (ref 32–34.5)
MCV RBC AUTO: 84.2 FL (ref 80–99.9)
MONOCYTES ABSOLUTE: 0.71 E9/L (ref 0.1–0.95)
MONOCYTES RELATIVE PERCENT: 7.9 % (ref 2–12)
NEUTROPHILS ABSOLUTE: 7.01 E9/L (ref 1.8–7.3)
NEUTROPHILS RELATIVE PERCENT: 78.2 % (ref 43–80)
PDW BLD-RTO: 12.7 FL (ref 11.5–15)
PLATELET # BLD: 141 E9/L (ref 130–450)
PMV BLD AUTO: 10.4 FL (ref 7–12)
POTASSIUM REFLEX MAGNESIUM: 3.7 MMOL/L (ref 3.5–5)
RBC # BLD: 4.17 E12/L (ref 3.5–5.5)
SODIUM BLD-SCNC: 134 MMOL/L (ref 132–146)
TOTAL PROTEIN: 5.7 G/DL (ref 6.4–8.3)
URINE CULTURE, ROUTINE: NORMAL
WBC # BLD: 9 E9/L (ref 4.5–11.5)

## 2020-02-17 PROCEDURE — 80053 COMPREHEN METABOLIC PANEL: CPT

## 2020-02-17 PROCEDURE — 97161 PT EVAL LOW COMPLEX 20 MIN: CPT

## 2020-02-17 PROCEDURE — 36415 COLL VENOUS BLD VENIPUNCTURE: CPT

## 2020-02-17 PROCEDURE — 97530 THERAPEUTIC ACTIVITIES: CPT

## 2020-02-17 PROCEDURE — 97165 OT EVAL LOW COMPLEX 30 MIN: CPT

## 2020-02-17 PROCEDURE — 6370000000 HC RX 637 (ALT 250 FOR IP): Performed by: FAMILY MEDICINE

## 2020-02-17 PROCEDURE — 6360000002 HC RX W HCPCS: Performed by: FAMILY MEDICINE

## 2020-02-17 PROCEDURE — 94664 DEMO&/EVAL PT USE INHALER: CPT

## 2020-02-17 PROCEDURE — 85025 COMPLETE CBC W/AUTO DIFF WBC: CPT

## 2020-02-17 PROCEDURE — 1200000000 HC SEMI PRIVATE

## 2020-02-17 PROCEDURE — 2700000000 HC OXYGEN THERAPY PER DAY

## 2020-02-17 RX ORDER — NICOTINE POLACRILEX 4 MG
15 LOZENGE BUCCAL PRN
Status: DISCONTINUED | OUTPATIENT
Start: 2020-02-17 | End: 2020-02-19 | Stop reason: HOSPADM

## 2020-02-17 RX ORDER — DEXTROSE MONOHYDRATE 25 G/50ML
12.5 INJECTION, SOLUTION INTRAVENOUS PRN
Status: DISCONTINUED | OUTPATIENT
Start: 2020-02-17 | End: 2020-02-19 | Stop reason: HOSPADM

## 2020-02-17 RX ORDER — DEXTROSE MONOHYDRATE 50 MG/ML
100 INJECTION, SOLUTION INTRAVENOUS PRN
Status: DISCONTINUED | OUTPATIENT
Start: 2020-02-17 | End: 2020-02-19 | Stop reason: HOSPADM

## 2020-02-17 RX ORDER — ALBUTEROL SULFATE 1.25 MG/3ML
1.25 SOLUTION RESPIRATORY (INHALATION) EVERY 4 HOURS PRN
Status: DISCONTINUED | OUTPATIENT
Start: 2020-02-17 | End: 2020-02-19 | Stop reason: HOSPADM

## 2020-02-17 RX ADMIN — SENNOSIDES 8.6 MG: 8.6 TABLET, FILM COATED ORAL at 20:20

## 2020-02-17 RX ADMIN — AMLODIPINE BESYLATE 5 MG: 5 TABLET ORAL at 09:51

## 2020-02-17 RX ADMIN — ASPIRIN 81 MG 81 MG: 81 TABLET ORAL at 09:51

## 2020-02-17 RX ADMIN — OSELTAMIVIR PHOSPHATE 30 MG: 30 CAPSULE ORAL at 20:20

## 2020-02-17 RX ADMIN — ALPRAZOLAM 0.25 MG: 0.25 TABLET ORAL at 20:20

## 2020-02-17 RX ADMIN — GLIMEPIRIDE 4 MG: 4 TABLET ORAL at 06:16

## 2020-02-17 RX ADMIN — OSELTAMIVIR PHOSPHATE 30 MG: 30 CAPSULE ORAL at 09:51

## 2020-02-17 RX ADMIN — ENOXAPARIN SODIUM 40 MG: 40 INJECTION SUBCUTANEOUS at 09:51

## 2020-02-17 RX ADMIN — ALBUTEROL SULFATE 1.25 MG: 1.25 SOLUTION RESPIRATORY (INHALATION) at 03:21

## 2020-02-17 RX ADMIN — ACETAMINOPHEN 650 MG: 325 TABLET ORAL at 06:16

## 2020-02-17 ASSESSMENT — PAIN DESCRIPTION - PAIN TYPE: TYPE: CHRONIC PAIN

## 2020-02-17 ASSESSMENT — PAIN - FUNCTIONAL ASSESSMENT: PAIN_FUNCTIONAL_ASSESSMENT: PREVENTS OR INTERFERES WITH MANY ACTIVE NOT PASSIVE ACTIVITIES

## 2020-02-17 ASSESSMENT — PAIN DESCRIPTION - PROGRESSION: CLINICAL_PROGRESSION: GRADUALLY WORSENING

## 2020-02-17 ASSESSMENT — PAIN DESCRIPTION - DESCRIPTORS: DESCRIPTORS: SHOOTING;SHARP;DULL

## 2020-02-17 ASSESSMENT — PAIN SCALES - GENERAL
PAINLEVEL_OUTOF10: 0
PAINLEVEL_OUTOF10: 7

## 2020-02-17 ASSESSMENT — PAIN DESCRIPTION - ONSET: ONSET: GRADUAL

## 2020-02-17 ASSESSMENT — PAIN DESCRIPTION - FREQUENCY: FREQUENCY: INTERMITTENT

## 2020-02-17 ASSESSMENT — PAIN DESCRIPTION - LOCATION: LOCATION: HIP

## 2020-02-17 ASSESSMENT — PAIN DESCRIPTION - ORIENTATION: ORIENTATION: RIGHT

## 2020-02-17 NOTE — HOME CARE
Patient is active with [facility] for skilled nursing, pt, ot. Will need ALL NEW HOME CARE ORDERS at discharge.  Earvin Mas lpn

## 2020-02-17 NOTE — PROGRESS NOTES
requires further education in this area   yes yes remind     ASSESSMENT:    Comments:  Patient was seen this date for PT evaluation with OT collaboration. Patient was agreeable to evaluation. Results of the functional assessment are noted above. Upon entering the room patient was found supine in bed. Pt able to perform supine to sit to sit EOB modA. Sat EOB x 5 minutes Asif to increase dynamic sitting balance and activity tolerance. Performed sit to stand, dynamic standing 4 minutes, and stand to sit modA [employer].  EOB to supine maxA. At end of session, patient in bed with call light and phone within reach, all lines and tubes intact, nursing notified. This patient can benefit from the continuation of skilled PT to maximize functional level and return to PLOF. Treatment:  Patient practiced and was instructed in the following treatment:     Therapeutic activities -  Pt able to perform supine to sit to sit EOB modA. Sat EOB x 5 minutes Asif to increase dynamic sitting balance and activity tolerance. Performed sit to stand, dynamic standing 4 minutes, and stand to sit modA [employer].  EOB to supine maxA. Cues provided for sequencing and hand placements. Pt's/ family goals   1. home    Patient and or family understand(s) diagnosis, prognosis, and plan of care. yes    PLAN:    PT care will be provided in accordance with the objectives noted above. Exercises and functional mobility practice will be used as well as appropriate assistive devices or modalities to obtain goals. Patient and family education will also be administered as needed. Frequency of treatments: 2-5x/week x 1-2 weeks.     Time in  1030  Time out  1100    Total Treatment Time  20 minutes     Evaluation Time includes thorough review of current medical information, gathering information on past medical history/social history and prior level of function, completion of standardized testing/informal observation of tasks, assessment of data and education on

## 2020-02-17 NOTE — PROGRESS NOTES
supine: Minimal Assist    Functional Transfers Moderate Assist w/ ww(cues for safety)  Stand by Assist    Functional Mobility NT (attempted lateral steps w/ ww toward HOB, unable d/t pain/weakness)  Minimal Assist    Balance Sitting:     Static:  Min. A    Dynamic: Min. A  Standing: w/ ww    Static: Min. A    Dynamic: Mod. A-->Max A (d/t weakness/faitgue)     Activity Tolerance Poor+  good   Visual/  Perceptual Glasses: Yes                  Hand dominance: R     Strength ROM Additional Info:    RUE  3+/5  Proximally: limited (145 degrees)  Distally: WFL Fair-  and wfl FMC/dexterity noted during ADL tasks       LUE 3+/5  Proximally: limited (145 degrees)  Distally: WFL Fair-  and wfl FMC/dexterity noted during ADL tasks         Hearing: slightly [place]  Sensation:  No c/o numbness or tingling  Tone: WFL   Edema: None noted            Treatment: Upon arrival, patient lying in bed and agreeable to OT session at this time in collaboration with PT. RN approved. Therapist facilitated bed mobility, functional transfers (EOB, sit<>stand), standing tolerance tasks(standing ~4 mins to increase independence in ADLs), seated tolerance tasks(seated ~5 mins to increase independence in ADLs) and functional mobility task with ww(attempted lateral steps towards HOB, unable d/t weakness/fatigue) - skilled cuing on hand placement, posture, body mechanics, ww management and safety. Therapist facilitated self-care retraining: UB/LB self-care tasks(donned depends), and seated grooming task(washed face) while educating pt on modified techniques, posture, safety and energy conservation techniques. Skilled monitoring of HR, O2 sats and pts response to treatment. Pt on 2.5L O2. Before activity, O2=94% and HR=84 bpm, after activity, O2=94% and HR=84 bpm. During standing, pt reported feeling dizzy, educated pt to look straight ahead and pt was put back to bed, pt once back in bed stated the dizziness was subsiding.  At end of session,

## 2020-02-17 NOTE — H&P
Skin reveals no jaundice or  pallor. CHEST:  Shows end-expiratory wheezing. HEART:  Distant but regular. NECK:  Supple. ABDOMEN:  Soft, nontender. EXTREMITIES:  Show no edema, but evidence of dry gangrene in the left  foot as stated above. DIAGNOSTIC IMPRESSION:  Influenza A; hypoxia with respiratory symptoms;  also known dry gangrene due to peripheral vascular disease of the left  lower extremity; also left leg pain, could be sciatica with difficulty  ambulating. PLAN:  Tamiflu. Aerosols. Fluids. Consider steroids. PT/OT. Probable placement once stable.         Kaci Elise MD    D: 02/17/2020 13:10:44       T: 02/17/2020 13:14:06     [email or web address]  Job#: [id number]     Doc#: [id number]    CC:

## 2020-02-17 NOTE — PROGRESS NOTES
Call sent to Dr. Vianca Bueno regarding getting a breathing treatment ordered to help patient breath easier.

## 2020-02-18 ENCOUNTER — APPOINTMENT (OUTPATIENT)
Dept: CT IMAGING | Age: 85
DRG: 194 | End: 2020-02-18
Payer: MEDICARE

## 2020-02-18 PROCEDURE — 2700000000 HC OXYGEN THERAPY PER DAY

## 2020-02-18 PROCEDURE — 97530 THERAPEUTIC ACTIVITIES: CPT

## 2020-02-18 PROCEDURE — 6360000002 HC RX W HCPCS: Performed by: FAMILY MEDICINE

## 2020-02-18 PROCEDURE — 72192 CT PELVIS W/O DYE: CPT

## 2020-02-18 PROCEDURE — 6370000000 HC RX 637 (ALT 250 FOR IP): Performed by: FAMILY MEDICINE

## 2020-02-18 PROCEDURE — 2580000003 HC RX 258: Performed by: FAMILY MEDICINE

## 2020-02-18 PROCEDURE — 97110 THERAPEUTIC EXERCISES: CPT

## 2020-02-18 PROCEDURE — 73700 CT LOWER EXTREMITY W/O DYE: CPT

## 2020-02-18 PROCEDURE — 1200000000 HC SEMI PRIVATE

## 2020-02-18 RX ORDER — BENZONATATE 100 MG/1
100 CAPSULE ORAL 3 TIMES DAILY PRN
Status: DISCONTINUED | OUTPATIENT
Start: 2020-02-18 | End: 2020-02-19 | Stop reason: HOSPADM

## 2020-02-18 RX ADMIN — SODIUM CHLORIDE: 4.5 INJECTION, SOLUTION INTRAVENOUS at 21:00

## 2020-02-18 RX ADMIN — ALPRAZOLAM 0.25 MG: 0.25 TABLET ORAL at 20:58

## 2020-02-18 RX ADMIN — AMLODIPINE BESYLATE 5 MG: 5 TABLET ORAL at 08:50

## 2020-02-18 RX ADMIN — ASPIRIN 81 MG 81 MG: 81 TABLET ORAL at 08:50

## 2020-02-18 RX ADMIN — ENOXAPARIN SODIUM 40 MG: 40 INJECTION SUBCUTANEOUS at 08:51

## 2020-02-18 RX ADMIN — SENNOSIDES 8.6 MG: 8.6 TABLET, FILM COATED ORAL at 20:58

## 2020-02-18 RX ADMIN — OSELTAMIVIR PHOSPHATE 30 MG: 30 CAPSULE ORAL at 20:58

## 2020-02-18 RX ADMIN — OSELTAMIVIR PHOSPHATE 30 MG: 30 CAPSULE ORAL at 08:51

## 2020-02-18 RX ADMIN — GLIMEPIRIDE 4 MG: 4 TABLET ORAL at 05:59

## 2020-02-18 ASSESSMENT — PAIN SCALES - GENERAL
PAINLEVEL_OUTOF10: 0
PAINLEVEL_OUTOF10: 0

## 2020-02-18 NOTE — PROGRESS NOTES
Subjective: The patient is awake and alert. Breathing better. Still complaining of LEFT hip pain radiating down to her knee. Objective:    BP (!) 164/73   Pulse 73   Temp 97.6 °F (36.4 °C) (Temporal)   Resp 16   Ht 4' 9\" (1.448 m)   Wt 129 lb (58.5 kg)   SpO2 98%   BMI 27.92 kg/m²     Heart:  RRR, no murmurs, gallops, or rubs.   Lungs:  CTA bilaterally, no wheeze, rales or rhonchi  Abd: bowel sounds present, nontender, nondistended, no masses  Extrem:  No clubbing, cyanosis, or edema  Dry gangrene LEFT foot    CBC with Differential:    Lab Results   Component Value Date    WBC 9.0 02/17/2020    RBC 4.17 02/17/2020    HGB 11.4 02/17/2020    HCT 35.1 02/17/2020     02/17/2020    MCV 84.2 02/17/2020    MCH 27.3 02/17/2020    MCHC 32.5 02/17/2020    RDW 12.7 02/17/2020    LYMPHOPCT 12.9 02/17/2020    MONOPCT 7.9 02/17/2020    BASOPCT 0.2 02/17/2020    MONOSABS 0.71 02/17/2020    LYMPHSABS 1.16 02/17/2020    EOSABS 0.04 02/17/2020    BASOSABS 0.02 02/17/2020     CMP:    Lab Results   Component Value Date     02/17/2020    K 3.7 02/17/2020    CL 97 02/17/2020    CO2 25 02/17/2020    BUN 11 02/17/2020    CREATININE 0.7 02/17/2020    GFRAA >60 02/17/2020    LABGLOM >60 02/17/2020    GLUCOSE 139 02/17/2020    PROT 5.7 02/17/2020    LABALBU 2.8 02/17/2020    CALCIUM 8.0 02/17/2020    BILITOT 0.3 02/17/2020    ALKPHOS 58 02/17/2020    AST 17 02/17/2020    ALT 9 02/17/2020        Assessment:    Patient Active Problem List   Diagnosis    Diabetes mellitus ([facility] [address]    Hypertension    Weakness    Gait disturbance    Gangrene of left foot ([facility] [address]    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

## 2020-02-18 NOTE — PROGRESS NOTES
Pt in bed upon arrival and son present. Son reports pt weak and not able to stand. Pt co Left hip pain. Performed prom/aarom L LE  - heel slides, hip abd and slr and pt tolerated well. Performed isometric quad sets and glut sets. Pt performed arom with assist to right le - quad sets, heel slides, slr, hip abd. Pt sat eob performed laq and ankle pumps amd worked on dynamic trunk control reaching for object in different planes. Pt fearful with flexing fwd reaching down. Pt tolerated sitting eob approx 15 min  and was fatigued. Per staff wanted pt returned to bed. Pt lying on care pad which was soiled with urine. Pt's gown changed with assist of nurse and clean care pad placed with pt rolling with min assist.      Treatment:  Patient practiced and was instructed in the following treatment:     Therapeutic activities -  Pt able to perform supine to sit to sit EOB modA. Sat EOB x 15 minutes sba increase dynamic sitting balance and activity tolerance.  Performed  Therapeutic ex to promote rom and flexability and decrease pain.  Rolling in bed.             Time in  1138  Time out  1220    Total Treatment Time  42 minutes   Pt making slow progress toward goals    CPT codes:  [] Low Complexity PT evaluation 59718  [] Moderate Complexity PT evaluation 33810  [] High Complexity PT evaluation 21903  [] PT Re-evaluation 91142  [] Gait training 33937  minutes  [] Manual therapy 36833  minutes  [x] Therapeutic activities 38991  27 minutes   [x] Therapeutic exercises 27907    15 minutes  [] Neuromuscular reeducation 23281  minutes     Rosa Skiff, [address]

## 2020-02-19 VITALS
WEIGHT: 129 LBS | HEART RATE: 82 BPM | TEMPERATURE: 97.5 F | BODY MASS INDEX: 27.83 KG/M2 | RESPIRATION RATE: 18 BRPM | SYSTOLIC BLOOD PRESSURE: 141 MMHG | OXYGEN SATURATION: 98 % | HEIGHT: 57 IN | DIASTOLIC BLOOD PRESSURE: 65 MMHG

## 2020-02-19 LAB
METER GLUCOSE: 221 MG/DL (ref 74–99)
METER GLUCOSE: 296 MG/DL (ref 74–99)

## 2020-02-19 PROCEDURE — 6370000000 HC RX 637 (ALT 250 FOR IP): Performed by: FAMILY MEDICINE

## 2020-02-19 PROCEDURE — 2700000000 HC OXYGEN THERAPY PER DAY

## 2020-02-19 PROCEDURE — 6360000002 HC RX W HCPCS: Performed by: FAMILY MEDICINE

## 2020-02-19 PROCEDURE — 97530 THERAPEUTIC ACTIVITIES: CPT

## 2020-02-19 PROCEDURE — 97535 SELF CARE MNGMENT TRAINING: CPT

## 2020-02-19 PROCEDURE — 82962 GLUCOSE BLOOD TEST: CPT

## 2020-02-19 RX ORDER — ALBUTEROL SULFATE 1.25 MG/3ML
1.25 SOLUTION RESPIRATORY (INHALATION) EVERY 4 HOURS PRN
Qty: 360 ML | Refills: 3 | DISCHARGE
Start: 2020-02-19 | End: 2020-03-06 | Stop reason: ALTCHOICE

## 2020-02-19 RX ORDER — FAMOTIDINE 20 MG/1
20 TABLET, FILM COATED ORAL DAILY
Status: DISCONTINUED | OUTPATIENT
Start: 2020-02-20 | End: 2020-02-19 | Stop reason: HOSPADM

## 2020-02-19 RX ORDER — FAMOTIDINE 20 MG/1
20 TABLET, FILM COATED ORAL 2 TIMES DAILY
Qty: 60 TABLET | Refills: 3 | DISCHARGE
Start: 2020-02-19 | End: 2020-03-06 | Stop reason: ALTCHOICE

## 2020-02-19 RX ORDER — FAMOTIDINE 20 MG/1
20 TABLET, FILM COATED ORAL 2 TIMES DAILY
Status: DISCONTINUED | OUTPATIENT
Start: 2020-02-19 | End: 2020-02-19

## 2020-02-19 RX ORDER — NAPROXEN 500 MG/1
250 TABLET ORAL 2 TIMES DAILY WITH MEALS
Status: DISCONTINUED | OUTPATIENT
Start: 2020-02-19 | End: 2020-02-19 | Stop reason: HOSPADM

## 2020-02-19 RX ORDER — NAPROXEN 250 MG/1
250 TABLET ORAL 2 TIMES DAILY WITH MEALS
Qty: 60 TABLET | Refills: 3 | DISCHARGE
Start: 2020-02-19

## 2020-02-19 RX ORDER — BENZONATATE 100 MG/1
100 CAPSULE ORAL 3 TIMES DAILY PRN
DISCHARGE
Start: 2020-02-19 | End: 2020-02-26

## 2020-02-19 RX ORDER — OSELTAMIVIR PHOSPHATE 30 MG/1
30 CAPSULE ORAL 2 TIMES DAILY
DISCHARGE
Start: 2020-02-19 | End: 2020-02-21

## 2020-02-19 RX ADMIN — MAGNESIUM HYDROXIDE 30 ML: 2400 SUSPENSION ORAL at 08:39

## 2020-02-19 RX ADMIN — NAPROXEN 250 MG: 500 TABLET ORAL at 09:43

## 2020-02-19 RX ADMIN — OSELTAMIVIR PHOSPHATE 30 MG: 30 CAPSULE ORAL at 08:39

## 2020-02-19 RX ADMIN — ASPIRIN 81 MG 81 MG: 81 TABLET ORAL at 08:39

## 2020-02-19 RX ADMIN — ENOXAPARIN SODIUM 40 MG: 40 INJECTION SUBCUTANEOUS at 08:36

## 2020-02-19 RX ADMIN — AMLODIPINE BESYLATE 5 MG: 5 TABLET ORAL at 08:38

## 2020-02-19 RX ADMIN — ACETAMINOPHEN 650 MG: 325 TABLET ORAL at 08:37

## 2020-02-19 RX ADMIN — FAMOTIDINE 20 MG: 20 TABLET, FILM COATED ORAL at 09:43

## 2020-02-19 RX ADMIN — ALPRAZOLAM 0.25 MG: 0.25 TABLET ORAL at 09:43

## 2020-02-19 RX ADMIN — BENZONATATE 100 MG: 100 CAPSULE ORAL at 06:02

## 2020-02-19 RX ADMIN — GLIMEPIRIDE 4 MG: 4 TABLET ORAL at 06:02

## 2020-02-19 ASSESSMENT — PAIN DESCRIPTION - LOCATION: LOCATION: LEG;FOOT

## 2020-02-19 ASSESSMENT — PAIN DESCRIPTION - FREQUENCY: FREQUENCY: INTERMITTENT

## 2020-02-19 ASSESSMENT — PAIN - FUNCTIONAL ASSESSMENT: PAIN_FUNCTIONAL_ASSESSMENT: PREVENTS OR INTERFERES SOME ACTIVE ACTIVITIES AND ADLS

## 2020-02-19 ASSESSMENT — PAIN DESCRIPTION - DESCRIPTORS: DESCRIPTORS: ACHING;DISCOMFORT

## 2020-02-19 ASSESSMENT — PAIN SCALES - GENERAL
PAINLEVEL_OUTOF10: 9
PAINLEVEL_OUTOF10: 0
PAINLEVEL_OUTOF10: 9

## 2020-02-19 ASSESSMENT — PAIN DESCRIPTION - ORIENTATION: ORIENTATION: LEFT

## 2020-02-19 ASSESSMENT — PAIN DESCRIPTION - PROGRESSION: CLINICAL_PROGRESSION: NOT CHANGED

## 2020-02-19 ASSESSMENT — PAIN DESCRIPTION - PAIN TYPE: TYPE: CHRONIC PAIN

## 2020-02-19 ASSESSMENT — PAIN DESCRIPTION - ONSET: ONSET: ON-GOING

## 2020-02-19 NOTE — DISCHARGE INSTR - COC
Continuity of Care Form    Patient Name: Laura River   :  1926  MRN:  [id number]    Admit date:  2020  Discharge date:  20    Code Status Order: [facility]   Advance Directives:   [address] [facility] Documentation     Date/Time Healthcare Directive Type of Healthcare Directive Copy in [address] [facility] [address] Agent's Name Healthcare Agent's Phone Number    [phone]  No, patient does not have an advance directive for healthcare treatment -- -- -- -- --          Admitting Physician:  Nelsy Cid MD  PCP: Nelsy Cid MD    Discharging Nurse: [address] [place] [address] Unit/Room#: 7161/6684-G  Discharging Unit Phone Number: [phone]    Emergency Contact:   Extended Emergency Contact Information  Primary Emergency Contact: Hola Wilson  Address: [address] [place] [address] Phone: [phone]  Relation: Child  Secondary Emergency Contact: Elba Appiah   [address] Phone: [phone]  Relation: Niece/Nephew    Past Surgical History:  Past Surgical History:   Procedure Laterality Date    APPENDECTOMY      CAROTID ENDARTERECTOMY Left     CHOLECYSTECTOMY  7/30/15    Laparoscopic    EYE SURGERY Bilateral     implanted lens        Immunization History: There is no immunization history on file for this patient.     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 Ventilator:    - No ventilator support    Rehab Therapies: Physical Therapy and Occupational Therapy  Weight Bearing Status/Restrictions: No weight bearing restirctions  Other Medical Equipment (for information only, NOT a DME order):  walker and bedside commode  Other Treatments: ***    Patient's personal belongings (please select all that are sent with patient):  Glasses, Hearing Aides left, dentures top and bottom    RN SIGNATURE:  Electronically signed by Magali Simon RN on 2/19/20 at 11:31 AM    CASE MANAGEMENT/SOCIAL WORK SECTION    Inpatient Status Date: ***    Readmission Risk Assessment Score:  Readmission Risk              Risk of Unplanned Readmission:        17           Discharging to Facility/ Agency   · Name: [facility]  · Address:  · Phone:  · Fax:    Dialysis Facility (if applicable)   · Name:  · Address:  · Dialysis Schedule:  · Phone:  · Fax:    / signature: Electronically signed by Marry Habermann, LSW on 2/19/2020 at 10:42 AM      PHYSICIAN SECTION    Prognosis: Fair    Condition at Discharge: Stable    Rehab Potential (if transferring to Rehab): Fair    Recommended Labs or Other Treatments After Discharge: ***    Physician Certification: I certify the above information and transfer of Worthy Cooks  is necessary for the continuing treatment of the diagnosis listed and that she requires [place] for less 30 days.      Update Admission H&P: No change in H&P    PHYSICIAN SIGNATURE:  Electronically signed by Chen Muhammad MD on 2/19/20 at 8:35 AM

## 2020-02-19 NOTE — CARE COORDINATION
Met with the pt and her son at the bedside. She was recently in the hospital and was discharged home with [facility]. Discussed rehab. Both were in agreement. The pt has been at [facility] in the past and is interested in returning there. Referral made to evin Lennon. [facility] has accepted the pt. HENS, envelope in soft chart. The [facility] will be picking up the pt at 12:30.  Nursing, pt and her son aware of the time

## 2020-02-19 NOTE — PROGRESS NOTES
requires further education in this area   x x x     ASSESSMENT:    Comments:  Pt supine and on bedpan upon entering room. Provided hygiene assist and transferred pt to bedside chair at end of session. Treatment:  Patient practiced and was instructed in the following treatment:     Therapeutic Activity: Pt demo good sitting balance upon sitting EOB. Pt required increased time between functional activity d/t pain in L LE. Pt demo good use of ww however demo short shuffled side steps during pivot d/t pain;verbal cues to utilize B UE more to ease pain and take weight off of L LE. Verbal cues for weight shifting and correct technique during pivot. Pt required mod a to scoot back into chair. Educated pt on weight shifting techniques to prevent further skin breakdown on buttocks. Educated pt's son on importance of mobility and OOB activity.  Therapeutic Exercise: As noted above to increase ROM and strength. PLAN:    Patient is making good progress towards established goals. Will continue with current POC.       Time in  0917  Time out  0940    Total Treatment Time  23 minutes     CPT codes:  [] Gait training 84208  minutes  [] Manual therapy 67184 minutes  [x] Therapeutic activities 15484 23 minutes  [] Therapeutic exercises 25661 0 minutes  [] Neuromuscular reeducation 33320 minutes    Caitlin Garner PTA 6717

## 2020-02-19 NOTE — PROGRESS NOTES
Occupational Therapy  OT BEDSIDE TREATMENT NOTE      Date:2020  Patient Name: Wendie Hayes  MRN: [id number]  : 1926  Room: [address] [place] [address]     Evaluating OT: Kelsea Guardado OTR/L #[id number]     AM-PAC Daily Activity Raw Score:      Recommended Adaptive Equipment: TBD      Diagnosis: Influenza A [J10.1]  Referring Provider: Kurtis Paula MD  Patient presented to ED for dizziness, was just d/c last week for gangrene of left third and fourth toe     Pertinent Medical History: DM, HTN, CVA (affected R side)     Precautions:  Falls, bed alarm, pleasantly confused, contact/droplet precautions, slightly [place] IRA, 2.5L O2     Home Living: Pt lives alone in 1 story apt with 3 JUAN PABLO(1 handrail), son lives next door  Bathroom setup: walk-in shower w/ shower seat   Equipment owned: shower seat, ww(per chart)     Prior Level of Function: Independent with ADLs , Assistance with IADLs(niece does laundry); ambulated w/ ww(per chart)  (Pt has been receiving home care prior to admission per chart)--Did not wear O2 @ home prior to admission. Driving: n/a     Pain Level: Pt c/o of moderate pain in LLE  Cognition: A&O: 3/4(not oriented to situation); Follows 1 step directions (required cues for initiation of task/sequencing)              Memory:  fair               Sequencing:  fair -              Problem solving: fair -              Judgement/safety: poor  Additional comments: Pt emotional throughout session tearing up at times.                 Functional Assessment:    Initial Eval Status  Date: 20 Treatment Status  Date: 20 STGs/LTGs  Treatment frequency: 1-4x/wk   Feeding Set-up   Set up Independent     Grooming Minimal Assist (seated EOB)  Min A  To comb hair while seated  Supervision   UB Dressing Minimal Assist (simulated)  Min A  seated Supervision    LB Dressing Dependent (donned depends)  Dependent  To don/doff socks while seated EOB Moderate Assist    Bathing Maximal Assist (simulated)  Max A  simulated Minimal Assist    Toileting Dependent (simulated) Dependent  Moderate Assist    Bed Mobility  Supine to sit: Moderate Assist   Sit to supine: Maximal Assist (d/t fatigue/weakness) Max A- supine to sit  Educated pt on technique to increase independence.    Supine to sit: Stand by Assist   Sit to supine: Minimal Assist    Functional Transfers Moderate Assist w/ ww(cues for safety) Max A- sit<->stand  Cuing for hand placement and body mechanics  Stand by Assist    Functional Mobility NT (attempted lateral steps w/ ww toward HOB, unable d/t pain/weakness) Mod A- stand pivot transfer  Using w/w  Minimal Assist    Balance Sitting:     Static:  Min. A    Dynamic: Min. A  Standing: w/ ww    Static: Min. A    Dynamic: Mod. A-->Max A (d/t weakness/faitgue) Sitting:     Static:  Min. A    Dynamic: Min. A  Standing: w/ ww    Static: Mod. A    Dynamic: Mod. A      Activity Tolerance Poor+ Fair-  good   Visual/  Perceptual Glasses: Yes                        Comments: Upon arrival pt supine in bed. Pt educated on techniques to increase independence and safety during ADL's, bed mobility, and functional transfers. At end of session pt left seated in bedside chair, call light within reach, son present. · Pt has made fair progress towards set goals.      · Continue with current plan of care    Treatment Time In:9:17            Treatment Time Out: 9:42             Treatment Charges: Mins Units   Ther Ex  93937     Manual Therapy 13229     Thera Activities 25727 15 1   ADL/Home Mgt 84940 10 1   Neuro Re-ed [id number]     Group Therapy      Orthotic manage/training  [id number]     Non-Billable Time     Total Timed Treatment 25 2       Jeremy Lr

## 2020-02-21 ENCOUNTER — HOSPITAL ENCOUNTER (OUTPATIENT)
Age: 85
Discharge: HOME | End: 2020-02-23

## 2020-02-21 LAB
BLOOD CULTURE, ROUTINE: NORMAL
CULTURE, BLOOD 2: NORMAL

## 2020-02-21 PROCEDURE — 80053 COMPREHEN METABOLIC PANEL: CPT

## 2020-02-21 PROCEDURE — 36415 COLL VENOUS BLD VENIPUNCTURE: CPT

## 2020-02-21 PROCEDURE — 83036 HEMOGLOBIN GLYCOSYLATED A1C: CPT

## 2020-02-21 PROCEDURE — 85025 COMPLETE CBC W/AUTO DIFF WBC: CPT

## 2020-03-06 ENCOUNTER — APPOINTMENT (OUTPATIENT)
Dept: CT IMAGING | Age: 85
End: 2020-03-06
Payer: MEDICARE

## 2020-03-06 ENCOUNTER — HOSPITAL ENCOUNTER (EMERGENCY)
Age: 85
Discharge: HOME | End: 2020-03-06
Attending: EMERGENCY MEDICINE
Payer: MEDICARE

## 2020-03-06 VITALS
TEMPERATURE: 97 F | OXYGEN SATURATION: 96 % | HEART RATE: 90 BPM | DIASTOLIC BLOOD PRESSURE: 73 MMHG | BODY MASS INDEX: 27.83 KG/M2 | RESPIRATION RATE: 16 BRPM | SYSTOLIC BLOOD PRESSURE: 146 MMHG | HEIGHT: 57 IN | WEIGHT: 129 LBS

## 2020-03-06 LAB
ALBUMIN SERPL-MCNC: 2.9 G/DL (ref 3.5–5.2)
ALP BLD-CCNC: 72 U/L (ref 35–104)
ALT SERPL-CCNC: 7 U/L (ref 0–32)
ANION GAP SERPL CALCULATED.3IONS-SCNC: 10 MMOL/L (ref 7–16)
APTT: 27.1 SEC (ref 24.5–35.1)
AST SERPL-CCNC: 13 U/L (ref 0–31)
BASOPHILS ABSOLUTE: 0.03 E9/L (ref 0–0.2)
BASOPHILS RELATIVE PERCENT: 0.2 % (ref 0–2)
BILIRUB SERPL-MCNC: 0.4 MG/DL (ref 0–1.2)
BUN BLDV-MCNC: 18 MG/DL (ref 8–23)
CALCIUM SERPL-MCNC: 8.8 MG/DL (ref 8.6–10.2)
CHLORIDE BLD-SCNC: 97 MMOL/L (ref 98–107)
CHP ED QC CHECK: YES
CO2: 25 MMOL/L (ref 22–29)
CREAT SERPL-MCNC: 0.7 MG/DL (ref 0.5–1)
EOSINOPHILS ABSOLUTE: 0.02 E9/L (ref 0.05–0.5)
EOSINOPHILS RELATIVE PERCENT: 0.2 % (ref 0–6)
GFR AFRICAN AMERICAN: >60
GFR NON-AFRICAN AMERICAN: >60 ML/MIN/1.73
GLUCOSE BLD-MCNC: 262 MG/DL
GLUCOSE BLD-MCNC: 267 MG/DL (ref 74–99)
HCT VFR BLD CALC: 35.4 % (ref 34–48)
HEMOGLOBIN: 11.3 G/DL (ref 11.5–15.5)
IMMATURE GRANULOCYTES #: 0.05 E9/L
IMMATURE GRANULOCYTES %: 0.4 % (ref 0–5)
INR BLD: 1
LYMPHOCYTES ABSOLUTE: 0.97 E9/L (ref 1.5–4)
LYMPHOCYTES RELATIVE PERCENT: 7.5 % (ref 20–42)
MCH RBC QN AUTO: 26.8 PG (ref 26–35)
MCHC RBC AUTO-ENTMCNC: 31.9 % (ref 32–34.5)
MCV RBC AUTO: 83.9 FL (ref 80–99.9)
METER GLUCOSE: 262 MG/DL (ref 74–99)
MONOCYTES ABSOLUTE: 0.72 E9/L (ref 0.1–0.95)
MONOCYTES RELATIVE PERCENT: 5.5 % (ref 2–12)
NEUTROPHILS ABSOLUTE: 11.22 E9/L (ref 1.8–7.3)
NEUTROPHILS RELATIVE PERCENT: 86.2 % (ref 43–80)
PDW BLD-RTO: 13.2 FL (ref 11.5–15)
PLATELET # BLD: 231 E9/L (ref 130–450)
PMV BLD AUTO: 10.8 FL (ref 7–12)
POTASSIUM SERPL-SCNC: 4.8 MMOL/L (ref 3.5–5)
PROTHROMBIN TIME: 11.5 SEC (ref 9.3–12.4)
RBC # BLD: 4.22 E12/L (ref 3.5–5.5)
SODIUM BLD-SCNC: 132 MMOL/L (ref 132–146)
TOTAL PROTEIN: 6.2 G/DL (ref 6.4–8.3)
TROPONIN: <0.01 NG/ML (ref 0–0.03)
WBC # BLD: 13 E9/L (ref 4.5–11.5)

## 2020-03-06 PROCEDURE — 93005 ELECTROCARDIOGRAM TRACING: CPT | Performed by: STUDENT IN AN ORGANIZED HEALTH CARE EDUCATION/TRAINING PROGRAM

## 2020-03-06 PROCEDURE — 85025 COMPLETE CBC W/AUTO DIFF WBC: CPT

## 2020-03-06 PROCEDURE — 99285 EMERGENCY DEPT VISIT HI MDM: CPT

## 2020-03-06 PROCEDURE — 70496 CT ANGIOGRAPHY HEAD: CPT

## 2020-03-06 PROCEDURE — 99291 CRITICAL CARE FIRST HOUR: CPT | Performed by: PSYCHIATRY & NEUROLOGY

## 2020-03-06 PROCEDURE — 82962 GLUCOSE BLOOD TEST: CPT

## 2020-03-06 PROCEDURE — 84484 ASSAY OF TROPONIN QUANT: CPT

## 2020-03-06 PROCEDURE — 0042T CT BRAIN PERFUSION: CPT

## 2020-03-06 PROCEDURE — 6360000004 HC RX CONTRAST MEDICATION: Performed by: RADIOLOGY

## 2020-03-06 PROCEDURE — 80053 COMPREHEN METABOLIC PANEL: CPT

## 2020-03-06 PROCEDURE — 85610 PROTHROMBIN TIME: CPT

## 2020-03-06 PROCEDURE — 85730 THROMBOPLASTIN TIME PARTIAL: CPT

## 2020-03-06 PROCEDURE — 70498 CT ANGIOGRAPHY NECK: CPT

## 2020-03-06 PROCEDURE — 70450 CT HEAD/BRAIN W/O DYE: CPT

## 2020-03-06 RX ORDER — M-VIT,TX,IRON,MINS/CALC/FOLIC 27MG-0.4MG
1 TABLET ORAL DAILY
COMMUNITY

## 2020-03-06 RX ORDER — RANITIDINE 150 MG/1
150 TABLET ORAL 2 TIMES DAILY
COMMUNITY

## 2020-03-06 RX ORDER — TRAMADOL HYDROCHLORIDE 50 MG/1
50 TABLET ORAL EVERY 8 HOURS PRN
COMMUNITY

## 2020-03-06 RX ORDER — BISACODYL 10 MG
10 SUPPOSITORY, RECTAL RECTAL DAILY PRN
COMMUNITY

## 2020-03-06 RX ORDER — BENZONATATE 100 MG/1
100 CAPSULE ORAL 3 TIMES DAILY PRN
COMMUNITY

## 2020-03-06 RX ORDER — ALBUTEROL SULFATE 1.25 MG/3ML
1 SOLUTION RESPIRATORY (INHALATION) EVERY 4 HOURS PRN
COMMUNITY

## 2020-03-06 RX ORDER — CLOPIDOGREL BISULFATE 75 MG/1
75 TABLET ORAL DAILY
Status: DISCONTINUED | OUTPATIENT
Start: 2020-03-06 | End: 2020-03-06 | Stop reason: HOSPADM

## 2020-03-06 RX ADMIN — IOPAMIDOL 100 ML: 755 INJECTION, SOLUTION INTRAVENOUS at 13:57

## 2020-03-06 NOTE — ED NOTES
Unable to obtain 18G IV per CT perfusion requirement. Dr. Herminia Camarena aware. US and IV supplies at bedside.      Dominguez Corrigan, RN  03/06/20 9441

## 2020-03-06 NOTE — ED NOTES
Bed: 05  Expected date:   Expected time:   Means of arrival:   Comments:  ems     Fernando Murphy RN  03/06/20 2332

## 2020-03-06 NOTE — CONSULTS
tablet by mouth daily 10/25/17   Stephany Lacy MD   senna (SENOKOT) 8.6 MG tablet Take 1 tablet by mouth nightly     Historical Provider, MD   ALPRAZolam (XANAX) 0.25 MG tablet Take 0.25 mg by mouth 2 times daily. Historical Provider, MD   glimepiride (AMARYL) 4 MG tablet Take 4 mg by mouth every morning (before breakfast)    Historical Provider, MD       Allergies:  Patient has no known allergies. History reviewed. No pertinent family history. Social History     Tobacco Use    Smoking status: Former Smoker    Smokeless tobacco: Never Used   Substance Use Topics    Alcohol use: No    Drug use: No          ROS:  Cannot check for items of ROS as the pt is not cooperative/responsive/awake      EXAMINATION:  BP (!) 161/71   Pulse 87   Temp 97 °F (36.1 °C) (Infrared)   Resp 14   Ht 4' 9\" (1.448 m)   Wt 129 lb (58.5 kg)   SpO2 96%   BMI 27.92 kg/m²     Neuro Exam:  Patient is awake, has significant slurred speech but is able to tell her name and her son's name and knows that he is her son. Knows about present since hospital but does not have orientation to time. Cannot follow simple commands. PERRL, has a prominent gaze deviation to the right side without being able to come back to midline. It seems that she blinks less when visual threats are coming from the left visual field   Left central facial palsy symmetric palate elevation. Motor: Moves all of extremities vigorously however has prominent neglect to the left extremities, normal tone, no adventitious movements  Sensation: Seems to have sensory neglect on the left side of the body  DTRs: +1 biceps/triceps/BR/Knees, +1 ankles, plantars down B/L. Coordination: Cannot be performed due to lack of cooperation  Gait: Cannot be performed due to lack of cooperation      I independently reviewed the labs and imaging studies.     ASSESSMENT:  51-year-old woman with hypertension and diabetes presented with right gaze deviation, left central facial palsy and prominent sensory neglect in the left side of the body. CT head showed stroke in the right parietal area. At home aspirin 81 mg daily. PLAN:   Admission to telemetry monitoring  We will obtain CTA head and neck. We will continue aspirin 81 mg daily and put her on Plavix 75 mg daily for 21 days. We will check LDL and hemoglobin A1c  PT/OT      All questions were answered to the patient's/family's satisfaction when they are available. I personally reviewed the history, exam findings, labs, imagings and other diagnostic tests/conveyed these findings and my assessment of these aforementioned items and also treatment plan, risks/benefits of treatment recommended and prognosis/goals of treatment to patient/family/staff. I spent 50 minutes of critical time personally with the patient/family/staff/resident(s) in examination, chart and imaging review, discussing natural history and prognosis, differential diagnosis, risks and benefits of treatment and instructions of which more than 50% of the time was spent for counseling and coordinating care.           Electronically signed by Keyshawn Olsen MD on 3/6/2020 at 11:17 AM

## 2020-03-06 NOTE — ED PROVIDER NOTES
Patient is a 80-year-old female that presents to the emergency department for evaluation of aphasia. Patient presented from nursing home after developing left-sided weakness and right-sided gaze deviation over the last 24 hours. Last known well was 8 AM yesterday. Symptoms progressed throughout that time and patient son decided to revoke her DNR CC and sent her in for further evaluation. Patient is nonverbal at this time and not able to provide any further history. No known history of stroke. The history is provided by the patient. Extremity Weakness   Severity:  Severe  Onset quality:  Gradual  Duration:  1 day  Timing:  Constant  Progression:  Worsening  Chronicity:  New  Relieved by:  Nothing  Worsened by:  Nothing  Ineffective treatments:  None tried  Risk factors: diabetes         Review of Systems   Unable to perform ROS: Mental status change        Physical Exam  Vitals signs and nursing note reviewed. Constitutional:       General: She is not in acute distress. Appearance: She is cachectic. She is ill-appearing. HENT:      Head: Normocephalic and atraumatic. Nose: Nose normal.      Mouth/Throat:      Mouth: Mucous membranes are moist.   Eyes:      Pupils: Pupils are equal, round, and reactive to light. Comments: Forced deviation to the right   Cardiovascular:      Rate and Rhythm: Regular rhythm. Tachycardia present. Pulses: Normal pulses. Heart sounds: Murmur present. Pulmonary:      Effort: Pulmonary effort is normal. No respiratory distress. Breath sounds: Normal breath sounds. No wheezing. Abdominal:      General: Abdomen is flat. There is no distension. Tenderness: There is no abdominal tenderness. There is no guarding or rebound. Lymphadenopathy:      Cervical: No cervical adenopathy. Skin:     General: Skin is warm and dry. Neurological:      Motor: Weakness (Generalized weakness although moving all 4 extremities equally.) present. QC OK? yes    POCT Glucose   Result Value Ref Range    Meter Glucose 262 (H) 74 - 99 mg/dL   EKG 12 Lead   Result Value Ref Range    Ventricular Rate 92 BPM    Atrial Rate 92 BPM    P-R Interval 174 ms    QRS Duration 84 ms    Q-T Interval 378 ms    QTc Calculation (Bazett) 467 ms    P Axis 75 degrees    R Axis 9 degrees    T Axis 86 degrees       Radiology:  CT Head WO Contrast   Final Result   Questionable small acute infarction in the right parietal region. There is no mass effect. Otherwise exam is unchanged         CTA HEAD W CONTRAST    (Results Pending)   CTA NECK W CONTRAST    (Results Pending)   CT BRAIN PERFUSION    (Results Pending)       ------------------------- NURSING NOTES AND VITALS REVIEWED ---------------------------  Date / Time Roomed:  3/6/2020  8:02 AM  ED Bed Assignment:  07/07    The nursing notes within the ED encounter and vital signs as below have been reviewed. /68   Pulse 89   Temp 97 °F (36.1 °C) (Infrared)   Resp 14   Ht 4' 9\" (1.448 m)   Wt 129 lb (58.5 kg)   SpO2 96%   BMI 27.92 kg/m²   Oxygen Saturation Interpretation: Normal      ------------------------------------------ PROGRESS NOTES ------------------------------------------  2:47 PM  I have spoken with the patient's son and discussed todays results, in addition to providing specific details for the plan of care and counseling regarding the diagnosis and prognosis. Their questions are answered at this time and they are agreeable with the plan. I discussed at length with them reasons for immediate return here for re evaluation. They will followup with their primary care physician by calling their office on Monday.      --------------------------------- ADDITIONAL PROVIDER NOTES ---------------------------------  At this time the patient is without objective evidence of an acute process requiring hospitalization or inpatient management.   They have remained hemodynamically stable throughout their entire ED

## 2020-03-07 LAB
EKG ATRIAL RATE: 92 BPM
EKG P AXIS: 75 DEGREES
EKG P-R INTERVAL: 174 MS
EKG Q-T INTERVAL: 378 MS
EKG QRS DURATION: 84 MS
EKG QTC CALCULATION (BAZETT): 467 MS
EKG R AXIS: 9 DEGREES
EKG T AXIS: 86 DEGREES
EKG VENTRICULAR RATE: 92 BPM

## 2020-03-07 PROCEDURE — 93010 ELECTROCARDIOGRAM REPORT: CPT | Performed by: INTERNAL MEDICINE

## 2020-03-10 ENCOUNTER — HOSPITAL ENCOUNTER (EMERGENCY)
Age: 85
Discharge: HOME | End: 2020-03-10
Attending: EMERGENCY MEDICINE
Payer: MEDICARE

## 2020-03-10 VITALS
WEIGHT: 129 LBS | RESPIRATION RATE: 18 BRPM | OXYGEN SATURATION: 97 % | BODY MASS INDEX: 27.92 KG/M2 | DIASTOLIC BLOOD PRESSURE: 75 MMHG | HEART RATE: 83 BPM | TEMPERATURE: 98 F | SYSTOLIC BLOOD PRESSURE: 134 MMHG

## 2020-03-10 LAB
ALBUMIN SERPL-MCNC: 3 G/DL (ref 3.5–5.2)
ALP BLD-CCNC: 73 U/L (ref 35–104)
ALT SERPL-CCNC: 6 U/L (ref 0–32)
ANION GAP SERPL CALCULATED.3IONS-SCNC: 12 MMOL/L (ref 7–16)
AST SERPL-CCNC: 12 U/L (ref 0–31)
BASOPHILS ABSOLUTE: 0.05 E9/L (ref 0–0.2)
BASOPHILS RELATIVE PERCENT: 0.5 % (ref 0–2)
BILIRUB SERPL-MCNC: 0.2 MG/DL (ref 0–1.2)
BUN BLDV-MCNC: 26 MG/DL (ref 8–23)
CALCIUM SERPL-MCNC: 9.1 MG/DL (ref 8.6–10.2)
CHLORIDE BLD-SCNC: 100 MMOL/L (ref 98–107)
CO2: 27 MMOL/L (ref 22–29)
CREAT SERPL-MCNC: 0.7 MG/DL (ref 0.5–1)
EOSINOPHILS ABSOLUTE: 0.3 E9/L (ref 0.05–0.5)
EOSINOPHILS RELATIVE PERCENT: 3.2 % (ref 0–6)
GFR AFRICAN AMERICAN: >60
GFR NON-AFRICAN AMERICAN: >60 ML/MIN/1.73
GLUCOSE BLD-MCNC: 280 MG/DL (ref 74–99)
HCT VFR BLD CALC: 40 % (ref 34–48)
HEMOGLOBIN: 12.7 G/DL (ref 11.5–15.5)
IMMATURE GRANULOCYTES #: 0.04 E9/L
IMMATURE GRANULOCYTES %: 0.4 % (ref 0–5)
LYMPHOCYTES ABSOLUTE: 1.37 E9/L (ref 1.5–4)
LYMPHOCYTES RELATIVE PERCENT: 14.5 % (ref 20–42)
MCH RBC QN AUTO: 26.7 PG (ref 26–35)
MCHC RBC AUTO-ENTMCNC: 31.8 % (ref 32–34.5)
MCV RBC AUTO: 84.2 FL (ref 80–99.9)
MONOCYTES ABSOLUTE: 0.55 E9/L (ref 0.1–0.95)
MONOCYTES RELATIVE PERCENT: 5.8 % (ref 2–12)
NEUTROPHILS ABSOLUTE: 7.13 E9/L (ref 1.8–7.3)
NEUTROPHILS RELATIVE PERCENT: 75.6 % (ref 43–80)
PDW BLD-RTO: 13.5 FL (ref 11.5–15)
PLATELET # BLD: 259 E9/L (ref 130–450)
PMV BLD AUTO: 10.3 FL (ref 7–12)
POTASSIUM REFLEX MAGNESIUM: 4 MMOL/L (ref 3.5–5)
RBC # BLD: 4.75 E12/L (ref 3.5–5.5)
REASON FOR REJECTION: NORMAL
REJECTED TEST: NORMAL
SODIUM BLD-SCNC: 139 MMOL/L (ref 132–146)
TOTAL PROTEIN: 6.1 G/DL (ref 6.4–8.3)
WBC # BLD: 9.4 E9/L (ref 4.5–11.5)

## 2020-03-10 PROCEDURE — 85025 COMPLETE CBC W/AUTO DIFF WBC: CPT

## 2020-03-10 PROCEDURE — 80053 COMPREHEN METABOLIC PANEL: CPT

## 2020-03-10 PROCEDURE — 99284 EMERGENCY DEPT VISIT MOD MDM: CPT

## 2020-03-10 PROCEDURE — 2580000003 HC RX 258: Performed by: STUDENT IN AN ORGANIZED HEALTH CARE EDUCATION/TRAINING PROGRAM

## 2020-03-10 RX ORDER — ALPRAZOLAM 0.5 MG/1
0.5 TABLET ORAL 3 TIMES DAILY PRN
COMMUNITY

## 2020-03-10 RX ORDER — 0.9 % SODIUM CHLORIDE 0.9 %
500 INTRAVENOUS SOLUTION INTRAVENOUS ONCE
Status: COMPLETED | OUTPATIENT
Start: 2020-03-10 | End: 2020-03-10

## 2020-03-10 RX ADMIN — SODIUM CHLORIDE 500 ML: 9 INJECTION, SOLUTION INTRAVENOUS at 11:43

## 2020-03-10 ASSESSMENT — ENCOUNTER SYMPTOMS
NAUSEA: 0
VOMITING: 0
DIARRHEA: 0
BACK PAIN: 0
CHEST TIGHTNESS: 0
ABDOMINAL PAIN: 0
SHORTNESS OF BREATH: 0
SORE THROAT: 0
CONSTIPATION: 1
COUGH: 0
RHINORRHEA: 0
BLOOD IN STOOL: 0
WHEEZING: 0

## 2020-03-10 NOTE — CARE COORDINATION
Social Work/ Discharge Planning:    Pt presents to the ED secondary to dysphasia. Pt is from [address] facility. Pt is familiar to this worker as she recently was in ED on 3/6 with a stroke and discharged to return to [facility]. Pt's code status was [facility] and plan at that time was to have hospice care at facility. JUDY met with pt's son, Olive Churchill, and ED physician and discussed pt's code status and plan of care. All questions answered by ED physician. SW explained hospice care at nursing facility (private pay) vs hospice care at home with 24hr care (private pay). Pt's son reported he is unable to care for pt at home but is concerned about pt returning to [facility] with hospice care and privately paying. SW to call [facility] liaison to come to ED and meet with pt and son. 1pm- JUDY and liaisons from [facility] met with pt and son. Pt now stating she would like to go to [facility] because her niece Jyotsna Shearer" works there and \"runs the whole place\". JUDY explained to pt's son that pt will still be private pay at any facility that she goes to with hospice care. Pt's son verbalized understanding but when SW provided options of hospice care, pt again asked about private pay vs insurance covering for pt's care. SW again explained pt will be private pay at facility with hospice care. Pt's son verbalized understanding and stated he would like [address] as he has already talked to them yesterday. JUDY made referrals to [facility] with [facility] and [place] with [address].

## 2020-03-10 NOTE — ED PROVIDER NOTES
Patient is 27-year-old female with past medical history significant for diabetes and CVA who presents the emergency department for complaints of dysphasia. Patient had a stroke 1 week ago. She has CODE STATUS of DNR CC. Sent in by Dr. Dimple Carter for an evaluation for trouble swallowing. Son is present for history. Son is concerned that patient has been placing food in her mouth but then she will spit it out a little dribble out. They have been trying to thicken it for her. Had an extensive conversation with patient's son regarding goals of care. He states that she has not had any other issues recently. Explained to him the process of doing a swallow evaluation followed by possible placement of a PEG tube. Also discussed with him death process including decreased p.o. intake. Patient states that she has not been showing any signs of distress. However, this is distressing to him. He is concerned about being able to pay for hospice care. He states that he is not able to take care of her at home. There appears to be a misunderstanding between comfort care goals and proceeding towards invasive surgeries including PEG tube placement. Patient's son's greatest concern is financial.  He is stating that he wants her to be in this hospital and to die in this hospital.  He does not understand that our hospital is not nursing home. Extensive conversation between social work and physician and patient's son to help define goals of care and to have son explain his goals of care for her and patient's goals of care as well. Review of Systems   Constitutional: Negative for appetite change, diaphoresis and fever. HENT: Negative for congestion, rhinorrhea and sore throat. Eyes: Negative for visual disturbance. Respiratory: Negative for cough, chest tightness, shortness of breath and wheezing. Cardiovascular: Negative for chest pain, palpitations and leg swelling.    Gastrointestinal: Positive for

## 2020-03-10 NOTE — PROGRESS NOTES
for this patient. Will be happy to take care of Angelika Velázquez when she is ready for hospice care.

## 2020-03-20 PROBLEM — J10.1 INFLUENZA A: Status: RESOLVED | Noted: 2020-02-16 | Resolved: 2020-03-20

## 2020-03-29 NOTE — DISCHARGE SUMMARY
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 ([address]        Lesli Stiles   Home Medication Instructions WTS:[id number]    Printed on:03/29/20 1059   Medication Information                      acetaminophen (TYLENOL) 325 MG tablet  Take 650 mg by mouth every 4 hours as needed for Pain or Fever              amLODIPine (NORVASC) 5 MG tablet  Take 1 tablet by mouth daily             aspirin 81 MG chewable tablet  Take 81 mg by mouth daily             glimepiride (AMARYL) 4 MG tablet  Take 4 mg by mouth 2 times daily              senna (SENOKOT) 8.6 MG tablet  Take 1 tablet by mouth nightly                   Hospital Course/Procedures:80year-old with diabetes and peripheral vascular disease was admitted on 2/7/2020 with dry gangrene of the LEFT foot mainly the second third and fourth toes. She denied fever or chills or much pain. She was adamant about not having any surgery or amputations. Patient was admitted. Vascular surgery was consulted. Patient refused amputation. Patient was discharged in stable condition to home with home health care on 2/11/2020. Consultants Following:vascular surgery    Disposition:home with home health care    Follow-up:she'll be followed by her PCP.       Starla López  3/29/2020  10:53 AM

## 2023-11-22 NOTE — PROGRESS NOTES
Admit Date: 2/7/2020       Subjective:    Pt admitted for gangrene of L foot apparently wanting no surgical intervention. Had elevated glucose at 480 on admission. amaryl as outpt / getting sq coverage of 6 u , and then 4 u this am with glucose of 79 this am    No pain complaints, uses walker for ambulation at home and has been up to br here    Will stop coverage for now.   vascular to see for any non surgical intervention         Objective:    Scheduled Meds:  Current Facility-Administered Medications   Medication Dose Route Frequency Provider Last Rate Last Dose    vancomycin 1.5 g in dextrose 5% 300 mL IVPB  1,500 mg Intravenous Once Antoine Wolfe MD        ALPRAZolam [facility]) tablet 0.25 mg  0.25 mg Oral BID Nelsy Cid MD   0.25 mg at 02/08/20 0918    amLODIPine (NORVASC) tablet 5 mg  5 mg Oral Daily Nelsy Cid MD   5 mg at 02/08/20 4720    aspirin chewable tablet 81 mg  81 mg Oral Daily Nelsy Cid MD   81 mg at 02/08/20 9291    glimepiride (AMARYL) tablet 4 mg  4 mg Oral QAM AC Nelsy Cid MD   4 mg at 02/08/20 4773    senna (SENOKOT) tablet 8.6 mg  1 tablet Oral Nightly Nelsy Cid MD   8.6 mg at 02/08/20 6602    sodium chloride flush 0.9 % injection 10 mL  10 mL Intravenous 2 times per day Nelsy Cid MD   10 mL at 02/08/20 0919    sodium chloride flush 0.9 % injection 10 mL  10 mL Intravenous PRN Nelsy Cid MD        magnesium hydroxide (MILK OF MAGNESIA) 400 MG/5ML suspension 30 mL  30 mL Oral Daily PRN Nelsy Cid MD        ondansetron [facility]) injection 4 mg  4 mg Intravenous Q6H PRN Nelsy Cid MD        enoxaparin (LOVENOX) injection 30 mg  30 mg Subcutaneous Daily Nelsy Cid MD   30 mg at 02/08/20 7182    acetaminophen (TYLENOL) tablet 650 mg  650 mg Oral Q4H PRN Nelsy Cid MD        insulin lispro (HUMALOG) injection vial 0-12 Units  0-12 Units Subcutaneous TID WC Nelsy Cid MD   6 Units at 02/07/20 1700    insulin lispro (HUMALOG) injection vial 0-6 Units  0-6 Units Subcutaneous Type of surgery: excision of multiple lipomas 2 on back, multiple on extremities and front  Location of surgery: [facility]  Date and time of surgery: 12/5/23 8:00am  Surgeon: Dr Hare  Pre-Op Appt Date: pt to schedule  Post-Op Appt Date: pt to schedule   Packet sent out: Yes  Pre-cert/Authorization completed:  Not Applicable  Date: 11/22/23     input(s): LDLCALCU  ABGs: No results found for: PHART, PO2ART, JDF3FLW  SpO2 Readings from Last 1 Encounters:   02/08/20 98%       Last 3 Troponin:    Lab Results   Component Value Date    TROPONINI <0.01 09/06/2019    TROPONINI <0.01 09/06/2019    TROPONINI <0.01 09/06/2019     Lab Results   Component Value Date    CKTOTAL 40 04/15/2019    CKTOTAL 22 10/22/2017    CKTOTAL 22 07/28/2015    CKMB 2.0 07/28/2015    TROPONINI <0.01 09/06/2019    TROPONINI <0.01 09/06/2019    TROPONINI <0.01 09/06/2019        TSH:  No results found for: TSH          U/A:     Lab Results   Component Value Date    COLORU Yellow 11/05/2018    PHUR 7.0 11/05/2018    WBCUA 2-5 10/22/2017    RBCUA NONE 10/22/2017    BACTERIA MODERATE 10/22/2017    CLARITYU Clear 11/05/2018    SPECGRAV 1.010 11/05/2018    LEUKOCYTESUR Negative 11/05/2018    UROBILINOGEN 0.2 11/05/2018    BILIRUBINUR Negative 11/05/2018    BLOODU Negative 11/05/2018    GLUCOSEU >=1000 11/05/2018          Iron studies:No results found for: FERRITIN  Bone disease:No results found for: PTH, MG, PHOS  Nutrition:No results found for: ALB           Assessment:  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 L foot ulcer   patient is adamantly against any type of surgical procedure, amputation, etc.       2. dm2/ Hyperglycemia on amaryl at home   480 on admission    Now 120    3. Hyperkalemia    4. pvd    5. Previous stroke    6.